# Patient Record
Sex: MALE | Race: WHITE
[De-identification: names, ages, dates, MRNs, and addresses within clinical notes are randomized per-mention and may not be internally consistent; named-entity substitution may affect disease eponyms.]

---

## 2017-06-22 ENCOUNTER — HOSPITAL ENCOUNTER (EMERGENCY)
Dept: HOSPITAL 25 - UCEAST | Age: 52
Discharge: HOME | End: 2017-06-22
Payer: COMMERCIAL

## 2017-06-22 VITALS — DIASTOLIC BLOOD PRESSURE: 97 MMHG | SYSTOLIC BLOOD PRESSURE: 139 MMHG

## 2017-06-22 DIAGNOSIS — L03.115: ICD-10-CM

## 2017-06-22 DIAGNOSIS — L02.415: Primary | ICD-10-CM

## 2017-06-22 DIAGNOSIS — I10: ICD-10-CM

## 2017-06-22 DIAGNOSIS — E11.9: ICD-10-CM

## 2017-06-22 PROCEDURE — 99212 OFFICE O/P EST SF 10 MIN: CPT

## 2017-06-22 PROCEDURE — 96372 THER/PROPH/DIAG INJ SC/IM: CPT

## 2017-06-22 PROCEDURE — 81003 URINALYSIS AUTO W/O SCOPE: CPT

## 2017-06-22 PROCEDURE — G0463 HOSPITAL OUTPT CLINIC VISIT: HCPCS

## 2017-06-22 NOTE — UC
Skin Complaint HPI





- HPI Summary


HPI Summary: 


Fevers and Chills on off for past 3 days---did have some loose stool yesterday, 

started his horses Bactrim on Tuesday night, does have a abscess on right thigh-

-that has been draining ---has a 6x7 inches area on right upper thigh of 

erythema, no abscess








- History of Current Complaint


Chief Complaint: UCGeneralIllness


Time Seen by Provider: 06/22/17 13:36


Stated Complaint: FEVER


Hx Obtained From: Patient


Onset/Duration: Sudden Onset, Lasting Days - 4, Still Present


Timing: Constant


Onset Severity: Moderate


Current Severity: Moderate


Location: Discrete - right upper anterior thigh


Character: Redness, Painful


Aggravating: Nothing


Alleviating: Nothing


Associated Signs & Symptoms: Positive: Negative





- Allergy/Home Medications


Allergies/Adverse Reactions: 


 Allergies











Allergy/AdvReac Type Severity Reaction Status Date / Time


 


No Known Allergies Allergy   Verified 04/09/15 18:32











Home Medications: 


 Home Medications





Sulfamethox/Trimethoprim DS* [Bactrim /160 TAB*] 1 tab PO BID 06/22/17 [

History Confirmed 06/22/17]











Review of Systems


Constitutional: Fever


Skin: Negative


Eyes: Negative


ENT: Negative


Respiratory: Negative


Cardiovascular: Negative


Gastrointestinal: Negative, Diarrhea


Genitourinary: Negative


Motor: Negative


Neurovascular: Negative


Musculoskeletal: Negative


Neurological: Negative


Psychological: Negative


All Other Systems Reviewed And Are Negative: Yes





PMH/Surg Hx/FS Hx/Imm Hx


Previously Healthy: No


Endocrine History: Diabetes


Cardiovascular History: Hypertension





- Surgical History


Surgical History: Yes


Surgery Procedure, Year, and Place: right foot - crushed calcaneous





- Family History


Known Family History: Positive: Hypertension





- Social History


Occupation: Employed Full-time


Lives: With Family


Alcohol Use: Rare


Substance Use Type: None


Smoking Status (MU): Never Smoked Tobacco





- Immunization History


Most Recent Tetanus Shot: within the last 10 years





Physical Exam


Triage Information Reviewed: Yes


Appearance: Well-Appearing, No Pain Distress, Well-Nourished


Vital Signs: 


 Initial Vital Signs











Temp  97.8 F   06/22/17 13:14


 


Pulse  99   06/22/17 13:14


 


Resp  18   06/22/17 13:14


 


BP  139/97   06/22/17 13:14


 


Pulse Ox  98   06/22/17 13:14











Vital Signs Reviewed: Yes


Eye Exam: Normal


Eyes: Positive: Conjunctiva Clear


ENT Exam: Normal


ENT: Positive: Normal ENT inspection, Hearing grossly normal, Pharynx normal, 

TMs normal.  Negative: Nasal congestion, Nasal drainage, Tonsillar swelling, 

Tonsillar exudate, Trismus, Muffled/hoarse voice


Dental Exam: Normal


Neck exam: Normal


Neck: Positive: Supple, Nontender, No Lymphadenopathy


Respiratory Exam: Normal


Respiratory: Positive: Chest non-tender, Lungs clear, Normal breath sounds, No 

respiratory distress, No accessory muscle use


Cardiovascular Exam: Normal


Cardiovascular: Positive: RRR, No Murmur, Pulses Normal, Brisk Capillary Refill


Abdominal Exam: Normal


Abdomen Description: Positive: Nontender, No Organomegaly, Soft


Bowel Sounds: Positive: Present


Musculoskeletal Exam: Normal


Musculoskeletal: Positive: Strength Intact, ROM Intact, No Edema


Neurological Exam: Normal


Neurological: Positive: Alert, Muscle Tone Normal


Psychological Exam: Normal


Skin: Positive: Other - 6x7 inch erythema right upper leg





Course/Dx





- Course


Course Of Treatment: rocephin, keflex, bactrim, follow with pcp in the next 

couple of days





- Differential Diagnoses - Skin Complaint


Differential Diagnoses: Abscess, Cellulitis, Impetigo





- Diagnoses


Provider Diagnoses: Abscess with cellulitis right leg





Discharge





- Discharge Plan


Condition: Stable


Disposition: HOME


Prescriptions: 


Cephalexin CAP* [Keflex CAP*] 500 mg PO QID #28 cap


Sulfamethox/Trimethoprim DS* [Bactrim /160 TAB*] 1 tab PO BID #14 tab


Patient Education Materials:  Cellulitis (ED), Fever in Adults (ED)


Referrals: 


Haile Bach MD [Primary Care Provider] - 3 Days

## 2018-01-14 ENCOUNTER — HOSPITAL ENCOUNTER (EMERGENCY)
Dept: HOSPITAL 25 - UCEAST | Age: 53
Discharge: HOME | End: 2018-01-14
Payer: COMMERCIAL

## 2018-01-14 VITALS — SYSTOLIC BLOOD PRESSURE: 136 MMHG | DIASTOLIC BLOOD PRESSURE: 87 MMHG

## 2018-01-14 DIAGNOSIS — J45.901: ICD-10-CM

## 2018-01-14 DIAGNOSIS — J11.1: Primary | ICD-10-CM

## 2018-01-14 PROCEDURE — 99213 OFFICE O/P EST LOW 20 MIN: CPT

## 2018-01-14 PROCEDURE — G0463 HOSPITAL OUTPT CLINIC VISIT: HCPCS

## 2018-01-14 PROCEDURE — 87502 INFLUENZA DNA AMP PROBE: CPT

## 2018-01-14 NOTE — UC
Respiratory Complaint HPI





- HPI Summary


HPI Summary: 





Cough, shortness of breath, malaise starting 2 days ago. Has a proair inhaler. 

Denies known fever; trouble sleeping due to cough lying down.





- History of Current Complaint


Chief Complaint: UCRespiratory


Stated Complaint: CHEST COLD


Time Seen by Provider: 01/14/18 16:42


Hx Obtained From: Patient


Onset/Duration: Gradual Onset, Lasting Days


Timing: Constant


Severity Initially: Moderate


Severity Currently: Moderate


Character: Cough: Productive


Aggravating Factors: Deep Breaths, Recumbent Position


Alleviating Factors: Bronchodilator


Associated Signs And Symptoms: Positive: Dyspnea, URI, Nasal Congestion





- Allergies/Home Medications


Allergies/Adverse Reactions: 


 Allergies











Allergy/AdvReac Type Severity Reaction Status Date / Time


 


No Known Allergies Allergy   Verified 04/09/15 18:32














PMH/Surg Hx/FS Hx/Imm Hx


Endocrine History: Diabetes - (pre)


Respiratory History: Asthma


GI/ History: Gastroesophageal Reflux





- Surgical History


Surgical History: Yes


Surgery Procedure, Year, and Place: right foot - crushed calcaneous





- Family History


Known Family History: Positive: Hypertension





- Social History


Occupation: Employed Full-time - IT for [employer]


Alcohol Use: Rare


Substance Use Type: None


Smoking Status (MU): Never Smoked Tobacco





- Immunization History


Most Recent Tetanus Shot: within the last 10 years





Review of Systems


Constitutional: Fatigue


Skin: Negative


Eyes: Negative


ENT: Sore Throat


Respiratory: Shortness Of Breath, Cough


Cardiovascular: Negative


Gastrointestinal: Negative


Genitourinary: Negative


Motor: Negative


Neurovascular: Negative


Musculoskeletal: Negative


Neurological: Negative


Psychological: Negative


Is Patient Immunocompromised?: No


All Other Systems Reviewed And Are Negative: Yes





Physical Exam


Triage Information Reviewed: Yes


Appearance: Ill-Appearing - acute illness, Obese


Vital Signs: 


 Initial Vital Signs











Temp  99.1 F   01/14/18 16:28


 


Pulse  111   01/14/18 16:28


 


Resp  24   01/14/18 16:28


 


BP  136/87   01/14/18 16:28


 


Pulse Ox  99   01/14/18 16:28











Vital Signs Reviewed: Yes


Eye Exam: Normal


Eyes: Positive: Conjunctiva Clear


ENT: Positive: Hearing grossly normal, TMs normal.  Negative: Pharyngeal 

erythema - mouth dry MM, Tonsillar swelling


Dental Exam: Normal


Neck exam: Normal


Neck: Positive: Supple, Nontender, No Lymphadenopathy


Respiratory: Positive: Accessory muscle use, Rhonchi, Wheezing, Other: - 

frequent dry hacking cough


Cardiovascular: Positive: No Murmur, Tachycardia


Musculoskeletal Exam: Normal


Neurological Exam: Normal


Neurological: Positive: Alert


Psychological Exam: Normal


Skin Exam: Normal





UC Diagnostic Evaluation





- Laboratory


O2 Sat by Pulse Oximetry: 99





Respiratory Course/Dx





- Differential Dx/Diagnosis


Provider Diagnoses: influenza.  asthma exacerbation





Discharge





- Discharge Plan


Condition: Stable


Disposition: HOME


Patient Education Materials:  Influenza (ED), Bronchospasm (ED)


Forms:  *Work Release


Referrals: 


Haile Bach MD [Primary Care Provider] - 2 Days


Additional Instructions: 


Follow up with your primary care office in 2-3 days; start the inhaled steroid. 

You might need to stay on the inhaled medicine for 2 weeks or more.

## 2018-01-17 ENCOUNTER — HOSPITAL ENCOUNTER (INPATIENT)
Dept: HOSPITAL 25 - ED | Age: 53
LOS: 3 days | Discharge: HOME | DRG: 141 | End: 2018-01-20
Attending: HOSPITALIST | Admitting: INTERNAL MEDICINE
Payer: COMMERCIAL

## 2018-01-17 DIAGNOSIS — Z79.51: ICD-10-CM

## 2018-01-17 DIAGNOSIS — Z82.49: ICD-10-CM

## 2018-01-17 DIAGNOSIS — E11.9: ICD-10-CM

## 2018-01-17 DIAGNOSIS — Z87.891: ICD-10-CM

## 2018-01-17 DIAGNOSIS — E87.6: ICD-10-CM

## 2018-01-17 DIAGNOSIS — B37.9: ICD-10-CM

## 2018-01-17 DIAGNOSIS — K21.9: ICD-10-CM

## 2018-01-17 DIAGNOSIS — R79.89: ICD-10-CM

## 2018-01-17 DIAGNOSIS — I10: ICD-10-CM

## 2018-01-17 DIAGNOSIS — Z79.84: ICD-10-CM

## 2018-01-17 DIAGNOSIS — J96.01: ICD-10-CM

## 2018-01-17 DIAGNOSIS — J44.9: ICD-10-CM

## 2018-01-17 DIAGNOSIS — E78.5: ICD-10-CM

## 2018-01-17 DIAGNOSIS — J45.901: Primary | ICD-10-CM

## 2018-01-17 DIAGNOSIS — R74.8: ICD-10-CM

## 2018-01-17 DIAGNOSIS — G47.33: ICD-10-CM

## 2018-01-17 DIAGNOSIS — J11.00: ICD-10-CM

## 2018-01-17 DIAGNOSIS — M54.9: ICD-10-CM

## 2018-01-17 DIAGNOSIS — R40.2412: ICD-10-CM

## 2018-01-17 DIAGNOSIS — G89.29: ICD-10-CM

## 2018-01-17 LAB
BASOPHILS # BLD AUTO: 0 10^3/UL (ref 0–0.2)
EOSINOPHIL # BLD AUTO: 0 10^3/UL (ref 0–0.6)
HCT VFR BLD AUTO: 41 % (ref 42–52)
HGB BLD-MCNC: 13.8 G/DL (ref 14–18)
INR PPP/BLD: 0.98 (ref 0.77–1.02)
LYMPHOCYTES # BLD AUTO: 1.3 10^3/UL (ref 1–4.8)
MCH RBC QN AUTO: 29 PG (ref 27–31)
MCHC RBC AUTO-ENTMCNC: 33 G/DL (ref 31–36)
MCV RBC AUTO: 88 FL (ref 80–94)
MONOCYTES # BLD AUTO: 0.8 10^3/UL (ref 0–0.8)
NEUTROPHILS # BLD AUTO: 13.7 10^3/UL (ref 1.5–7.7)
NRBC # BLD AUTO: 0 10^3/UL
NRBC BLD QL AUTO: 0
PLATELET # BLD AUTO: 331 10^3/UL (ref 150–450)
RBC # BLD AUTO: 4.69 10^6/UL (ref 4–5.4)
WBC # BLD AUTO: 15.8 10^3/UL (ref 3.5–10.8)

## 2018-01-17 PROCEDURE — 81003 URINALYSIS AUTO W/O SCOPE: CPT

## 2018-01-17 PROCEDURE — 71275 CT ANGIOGRAPHY CHEST: CPT

## 2018-01-17 PROCEDURE — 94640 AIRWAY INHALATION TREATMENT: CPT

## 2018-01-17 PROCEDURE — 80053 COMPREHEN METABOLIC PANEL: CPT

## 2018-01-17 PROCEDURE — 83605 ASSAY OF LACTIC ACID: CPT

## 2018-01-17 PROCEDURE — 87502 INFLUENZA DNA AMP PROBE: CPT

## 2018-01-17 PROCEDURE — 83880 ASSAY OF NATRIURETIC PEPTIDE: CPT

## 2018-01-17 PROCEDURE — 85379 FIBRIN DEGRADATION QUANT: CPT

## 2018-01-17 PROCEDURE — 81015 MICROSCOPIC EXAM OF URINE: CPT

## 2018-01-17 PROCEDURE — 83690 ASSAY OF LIPASE: CPT

## 2018-01-17 PROCEDURE — 94760 N-INVAS EAR/PLS OXIMETRY 1: CPT

## 2018-01-17 PROCEDURE — 85025 COMPLETE CBC W/AUTO DIFF WBC: CPT

## 2018-01-17 PROCEDURE — 87040 BLOOD CULTURE FOR BACTERIA: CPT

## 2018-01-17 PROCEDURE — 36415 COLL VENOUS BLD VENIPUNCTURE: CPT

## 2018-01-17 PROCEDURE — 80076 HEPATIC FUNCTION PANEL: CPT

## 2018-01-17 PROCEDURE — 85730 THROMBOPLASTIN TIME PARTIAL: CPT

## 2018-01-17 PROCEDURE — 84484 ASSAY OF TROPONIN QUANT: CPT

## 2018-01-17 PROCEDURE — 80048 BASIC METABOLIC PNL TOTAL CA: CPT

## 2018-01-17 PROCEDURE — 85610 PROTHROMBIN TIME: CPT

## 2018-01-17 PROCEDURE — 76705 ECHO EXAM OF ABDOMEN: CPT

## 2018-01-17 PROCEDURE — 93005 ELECTROCARDIOGRAM TRACING: CPT

## 2018-01-17 PROCEDURE — 71045 X-RAY EXAM CHEST 1 VIEW: CPT

## 2018-01-17 PROCEDURE — 86140 C-REACTIVE PROTEIN: CPT

## 2018-01-17 RX ADMIN — SODIUM CHLORIDE ONE ML: 900 IRRIGANT IRRIGATION at 09:52

## 2018-01-17 RX ADMIN — SODIUM CHLORIDE SCH MLS/HR: 900 IRRIGANT IRRIGATION at 14:19

## 2018-01-17 RX ADMIN — TAPENTADOL HYDROCHLORIDE SCH: 50 TABLET, FILM COATED ORAL at 14:35

## 2018-01-17 RX ADMIN — INSULIN LISPRO SCH UNIT: 100 INJECTION, SOLUTION INTRAVENOUS; SUBCUTANEOUS at 17:05

## 2018-01-17 RX ADMIN — CEFTRIAXONE SODIUM SCH: 1 INJECTION, POWDER, FOR SOLUTION INTRAVENOUS at 13:40

## 2018-01-17 RX ADMIN — HEPARIN SODIUM SCH UNITS: 5000 INJECTION INTRAVENOUS; SUBCUTANEOUS at 23:14

## 2018-01-17 RX ADMIN — CITALOPRAM SCH MG: 40 TABLET ORAL at 23:30

## 2018-01-17 RX ADMIN — HEPARIN SODIUM SCH UNITS: 5000 INJECTION INTRAVENOUS; SUBCUTANEOUS at 14:18

## 2018-01-17 RX ADMIN — SODIUM CHLORIDE ONE ML: 900 IRRIGANT IRRIGATION at 08:20

## 2018-01-17 RX ADMIN — TAPENTADOL HYDROCHLORIDE SCH MG: 50 TABLET, FILM COATED ORAL at 15:26

## 2018-01-17 RX ADMIN — SODIUM CHLORIDE ONE ML: 900 IRRIGANT IRRIGATION at 08:43

## 2018-01-17 RX ADMIN — MOMETASONE FUROATE AND FORMOTEROL FUMARATE DIHYDRATE SCH PUFF: 200; 5 AEROSOL RESPIRATORY (INHALATION) at 20:30

## 2018-01-17 RX ADMIN — IPRATROPIUM BROMIDE AND ALBUTEROL SULFATE SCH NEB: .5; 3 SOLUTION RESPIRATORY (INHALATION) at 13:28

## 2018-01-17 RX ADMIN — IPRATROPIUM BROMIDE AND ALBUTEROL SULFATE SCH NEB: .5; 3 SOLUTION RESPIRATORY (INHALATION) at 20:30

## 2018-01-17 RX ADMIN — TAPENTADOL HYDROCHLORIDE SCH MG: 50 TABLET, FILM COATED ORAL at 23:09

## 2018-01-17 NOTE — ED
Rohan CHO Angela, scribed for Lico Clifton MD on 01/17/18 at 0810 .





Shortness of Breath





- HPI Summary


HPI Summary: 





This pt is a 53 y/o male presenting to [facility] via EMS c/o worsening SOB and 

cough. Pt reports he was seen at Urgent Care on 1/14/18 and was diagnosed with 

influenza B. Pt states that he was given Tamiflu, Robitussin, prednisone, 

duoneb. He notes that since then the pt has had worsening cough, SOB, and 

intermittent fever (max of 102.5F), nausea, continued diarrhea, muscle ache, 

and wheezing. Denies vomiting, abd pain. Pt states he can only walk a few feet 

before he gets SOB, he normally walks 1 mile. Pt notes his swelling in LE is 

nothing acute. 


Pt has had a previous hospitalization more than 10 years ago for a respiratory 

illness. 


PMHx includes COPD (diagnosed 10+ years), asthma, pre-diabetes.


NKDA. 





- History of Current Complaint


Chief Complaint: EDShortnessOfBreath


Time Seen by Provider: 01/17/18 08:04


Hx Obtained From: Patient


Onset/Duration: Lasting Days, Still Present


Timing: Constant


Current Severity: Severe


Dyspnea At: Rest


Aggrevating Factors: Nothing


Alleviating Factors: Oxygen


Associated Signs & Symptoms: Cough (Productive), Wheezing, Fever, Calf Pain/

Swelling - calf swelling not acute





- Allergy/Home Medications


Allergies/Adverse Reactions: 


 Allergies











Allergy/AdvReac Type Severity Reaction Status Date / Time


 


No Known Allergies Allergy   Verified 04/09/15 18:32











Home Medications: 


 Home Medications





Budesonide/Formote 160/4.5(NF) [Symbicort 160/4.5 (NF)] 2 puff INH BID 01/17/18 

[History Confirmed 01/17/18]


Esomeprazole(NF) [NexIUM(NF)] 40 mg PO DAILY 01/17/18 [History Confirmed 01/17/ 18]


Fexofenadine (NF) [Allegra (NF)] 60 mg PO DAILY PRN 01/17/18 [History Confirmed 

01/17/18]


Oseltamivir CAP* [Tamiflu CAP*] 75 mg PO BID 01/17/18 [History Confirmed 01/17/ 18]


Tapentadol ER (NF) [Nucynta ER (NF)] 150 mg PO BID 01/17/18 [History Confirmed 

01/17/18]











PMH/Surg Hx/FS Hx/Imm Hx


Endocrine/Hematology History: Reports: Hx Diabetes - pre-diabetes


   Denies: Hx Thyroid Disease


Cardiovascular History: 


   Denies: Hx Hypertension


Respiratory History: Reports: Hx Asthma, Hx Sleep Apnea


   Denies: Hx Chronic Obstructive Pulmonary Disease (COPD)


GI History: Reports: Hx Ulcer - gerd





- Surgical History


Surgery Procedure, Year, and Place: right foot - crushed calcaneous


Infectious Disease History: No


Infectious Disease History: 


   Denies: Hx Clostridium Difficile, Hx Hepatitis, Hx Human Immunodeficiency 

Virus (HIV), Hx of Known/Suspected MRSA, Hx Shingles, Hx Tuberculosis, Traveled 

Outside the [place] in Last 30 Days





- Family History


Known Family History: Positive: Hypertension


   Negative: Other - FHx of COPD or liver disease





- Social History


Alcohol Use: Rare


Substance Use Type: Reports: None


Smoking Status (MU): Never Smoked Tobacco





Review of Systems


Positive: Fever


Positive: Shortness Of Breath, Cough


Positive: Diarrhea, Nausea.  Negative: Abdominal Pain, Vomiting


Positive: Myalgia, Edema - in LE, not acute


All Other Systems Reviewed And Are Negative: Yes





Physical Exam





- Summary


Physical Exam Summary: 





General: mildly ill-appearing, no pain distress


Skin: warm, color reflects adequate perfusion, dry


Head: normal


Eyes: EOMI, RHODA


ENT: normal


Neck: supple, nontender


Respiratory: Lungs with rhonchi and wheezes bilaterally, breath sounds present


Cardiovascular: tachycardic


Abdomen: soft, nontender


Bowel: present


Musculoskeletal: strength/ROM intact. RLE: 1+ pitting edema. LLE: trace pitting 

edema. Calfs are nontender.


Neurological: normal, sensory/motor intact, A&O x3


Psychological: affect/mood appropriate


Triage Information Reviewed: Yes


Vital Signs On Initial Exam: 


 Initial Vitals











Temp Pulse Resp BP Pulse Ox


 


 100.5 F   109   22   149/69   95 


 


 01/17/18 07:56  01/17/18 07:56  01/17/18 07:56  01/17/18 07:56  01/17/18 07:56











Vital Signs Reviewed: Yes





- Jada Coma Scale


Coma Scale Total: 15





Diagnostics





- Vital Signs


 Vital Signs











  Temp Pulse Resp BP Pulse Ox


 


 01/17/18 07:56  100.5 F  109  22  149/69  95














- Laboratory


Lab Results: 


 Lab Results











  01/17/18 01/17/18 01/17/18 Range/Units





  08:19 08:19 08:19 


 


WBC     (3.5-10.8)  10^3/ul


 


RBC     (4.0-5.4)  10^6/ul


 


Hgb     (14.0-18.0)  g/dl


 


Hct     (42-52)  %


 


MCV     (80-94)  fL


 


MCH     (27-31)  pg


 


MCHC     (31-36)  g/dl


 


RDW     (10.5-15)  %


 


Plt Count     (150-450)  10^3/ul


 


MPV     (7.4-10.4)  um3


 


Neut % (Auto)     (38-83)  %


 


Lymph % (Auto)     (25-47)  %


 


Mono % (Auto)     (1-9)  %


 


Eos % (Auto)     (0-6)  %


 


Baso % (Auto)     (0-2)  %


 


Absolute Neuts (auto)     (1.5-7.7)  10^3/ul


 


Absolute Lymphs (auto)     (1.0-4.8)  10^3/ul


 


Absolute Monos (auto)     (0-0.8)  10^3/ul


 


Absolute Eos (auto)     (0-0.6)  10^3/ul


 


Absolute Basos (auto)     (0-0.2)  10^3/ul


 


Absolute Nucleated RBC     10^3/ul


 


Nucleated RBC %     


 


INR (Anticoag Therapy)  0.98    (0.77-1.02)  


 


APTT  16.6 L    (26.0-36.3)  seconds


 


D-Dimer, Quantitative  244 H    (Less Than 230)  ng/mL


 


Sodium    130 L  (133-145)  mmol/L


 


Potassium    TNP  


 


Chloride    91 L  (101-111)  mmol/L


 


Carbon Dioxide    30  (22-32)  mmol/L


 


Anion Gap    9  (2-11)  mmol/L


 


BUN    15  (6-24)  mg/dL


 


Creatinine    0.91  (0.67-1.17)  mg/dL


 


Est GFR ( Amer)    113.0  (>60)  


 


Est GFR (Non-Af Amer)    87.8  (>60)  


 


BUN/Creatinine Ratio    16.5  (8-20)  


 


Glucose    199 H  ()  mg/dL


 


Lactic Acid     (0.5-2.0)  mmol/L


 


Calcium    9.4  (8.6-10.3)  mg/dL


 


Total Bilirubin    0.90  (0.2-1.0)  mg/dL


 


AST    TNP  


 


ALT    55 H  (7-52)  U/L


 


Alkaline Phosphatase    69  ()  U/L


 


Troponin I    0.04 H*  (<0.04)  ng/mL


 


C-Reactive Protein    148.59 H  (< 5.00)  mg/L


 


B-Natriuretic Peptide   62  ( - 100) pg/mL


 


Total Protein    7.7  (6.4-8.9)  g/dL


 


Albumin    4.2  (3.2-5.2)  g/dL


 


Globulin    3.5  (2-4)  g/dL


 


Albumin/Globulin Ratio    1.2  (1-3)  


 


Lipase    212 H  (11.0-82.0)  U/L


 


Urine Color     


 


Urine Appearance     


 


Urine pH     (5-9)  


 


Ur Specific Gravity     (1.010-1.030)  


 


Urine Protein     (Negative)  


 


Urine Ketones     (Negative)  


 


Urine Blood     (Negative)  


 


Urine Nitrate     (Negative)  


 


Urine Bilirubin     (Negative)  


 


Urine Urobilinogen     (Negative)  


 


Ur Leukocyte Esterase     (Negative)  


 


Urine WBC (Auto)     (Absent)  


 


Urine RBC (Auto)     (Absent)  


 


Urine Bacteria     (Absent)  


 


Urine Glucose     (Negative)  














  01/17/18 01/17/18 01/17/18 Range/Units





  08:19 08:19 09:02 


 


WBC  15.8 H    (3.5-10.8)  10^3/ul


 


RBC  4.69    (4.0-5.4)  10^6/ul


 


Hgb  13.8 L    (14.0-18.0)  g/dl


 


Hct  41 L    (42-52)  %


 


MCV  88    (80-94)  fL


 


MCH  29    (27-31)  pg


 


MCHC  33    (31-36)  g/dl


 


RDW  16 H    (10.5-15)  %


 


Plt Count  331    (150-450)  10^3/ul


 


MPV  8    (7.4-10.4)  um3


 


Neut % (Auto)  86.8 H    (38-83)  %


 


Lymph % (Auto)  7.9 L    (25-47)  %


 


Mono % (Auto)  5.0    (1-9)  %


 


Eos % (Auto)  0    (0-6)  %


 


Baso % (Auto)  0.3    (0-2)  %


 


Absolute Neuts (auto)  13.7 H    (1.5-7.7)  10^3/ul


 


Absolute Lymphs (auto)  1.3    (1.0-4.8)  10^3/ul


 


Absolute Monos (auto)  0.8    (0-0.8)  10^3/ul


 


Absolute Eos (auto)  0    (0-0.6)  10^3/ul


 


Absolute Basos (auto)  0    (0-0.2)  10^3/ul


 


Absolute Nucleated RBC  0    10^3/ul


 


Nucleated RBC %  0    


 


INR (Anticoag Therapy)     (0.77-1.02)  


 


APTT     (26.0-36.3)  seconds


 


D-Dimer, Quantitative     (Less Than 230)  ng/mL


 


Sodium     (133-145)  mmol/L


 


Potassium     


 


Chloride     (101-111)  mmol/L


 


Carbon Dioxide     (22-32)  mmol/L


 


Anion Gap     (2-11)  mmol/L


 


BUN     (6-24)  mg/dL


 


Creatinine     (0.67-1.17)  mg/dL


 


Est GFR ( Amer)     (>60)  


 


Est GFR (Non-Af Amer)     (>60)  


 


BUN/Creatinine Ratio     (8-20)  


 


Glucose     ()  mg/dL


 


Lactic Acid   1.8   (0.5-2.0)  mmol/L


 


Calcium     (8.6-10.3)  mg/dL


 


Total Bilirubin     (0.2-1.0)  mg/dL


 


AST     


 


ALT     (7-52)  U/L


 


Alkaline Phosphatase     ()  U/L


 


Troponin I     (<0.04)  ng/mL


 


C-Reactive Protein     (< 5.00)  mg/L


 


B-Natriuretic Peptide    ( - 100) pg/mL


 


Total Protein     (6.4-8.9)  g/dL


 


Albumin     (3.2-5.2)  g/dL


 


Globulin     (2-4)  g/dL


 


Albumin/Globulin Ratio     (1-3)  


 


Lipase     (11.0-82.0)  U/L


 


Urine Color    Yellow  


 


Urine Appearance    Clear  


 


Urine pH    7.0  (5-9)  


 


Ur Specific Gravity    1.012  (1.010-1.030)  


 


Urine Protein    1+(30 mg/dl) H  (Negative)  


 


Urine Ketones    Trace H  (Negative)  


 


Urine Blood    Negative  (Negative)  


 


Urine Nitrate    Negative  (Negative)  


 


Urine Bilirubin    Negative  (Negative)  


 


Urine Urobilinogen    Negative  (Negative)  


 


Ur Leukocyte Esterase    Negative  (Negative)  


 


Urine WBC (Auto)    Trace(0-5/hpf)  (Absent)  


 


Urine RBC (Auto)    Trace(0-2/hpf)  (Absent)  


 


Urine Bacteria    Absent  (Absent)  


 


Urine Glucose    Negative  (Negative)  














  01/17/18 01/17/18 Range/Units





  10:00 11:55 


 


WBC    (3.5-10.8)  10^3/ul


 


RBC    (4.0-5.4)  10^6/ul


 


Hgb    (14.0-18.0)  g/dl


 


Hct    (42-52)  %


 


MCV    (80-94)  fL


 


MCH    (27-31)  pg


 


MCHC    (31-36)  g/dl


 


RDW    (10.5-15)  %


 


Plt Count    (150-450)  10^3/ul


 


MPV    (7.4-10.4)  um3


 


Neut % (Auto)    (38-83)  %


 


Lymph % (Auto)    (25-47)  %


 


Mono % (Auto)    (1-9)  %


 


Eos % (Auto)    (0-6)  %


 


Baso % (Auto)    (0-2)  %


 


Absolute Neuts (auto)    (1.5-7.7)  10^3/ul


 


Absolute Lymphs (auto)    (1.0-4.8)  10^3/ul


 


Absolute Monos (auto)    (0-0.8)  10^3/ul


 


Absolute Eos (auto)    (0-0.6)  10^3/ul


 


Absolute Basos (auto)    (0-0.2)  10^3/ul


 


Absolute Nucleated RBC    10^3/ul


 


Nucleated RBC %    


 


INR (Anticoag Therapy)    (0.77-1.02)  


 


APTT    (26.0-36.3)  seconds


 


D-Dimer, Quantitative    (Less Than 230)  ng/mL


 


Sodium    (133-145)  mmol/L


 


Potassium  3.3 L   


 


Chloride    (101-111)  mmol/L


 


Carbon Dioxide    (22-32)  mmol/L


 


Anion Gap    (2-11)  mmol/L


 


BUN    (6-24)  mg/dL


 


Creatinine    (0.67-1.17)  mg/dL


 


Est GFR ( Amer)    (>60)  


 


Est GFR (Non-Af Amer)    (>60)  


 


BUN/Creatinine Ratio    (8-20)  


 


Glucose    ()  mg/dL


 


Lactic Acid   2.0  (0.5-2.0)  mmol/L


 


Calcium    (8.6-10.3)  mg/dL


 


Total Bilirubin    (0.2-1.0)  mg/dL


 


AST  45 H   


 


ALT    (7-52)  U/L


 


Alkaline Phosphatase    ()  U/L


 


Troponin I    (<0.04)  ng/mL


 


C-Reactive Protein    (< 5.00)  mg/L


 


B-Natriuretic Peptide   ( - 100) pg/mL


 


Total Protein    (6.4-8.9)  g/dL


 


Albumin    (3.2-5.2)  g/dL


 


Globulin    (2-4)  g/dL


 


Albumin/Globulin Ratio    (1-3)  


 


Lipase    (11.0-82.0)  U/L


 


Urine Color    


 


Urine Appearance    


 


Urine pH    (5-9)  


 


Ur Specific Gravity    (1.010-1.030)  


 


Urine Protein    (Negative)  


 


Urine Ketones    (Negative)  


 


Urine Blood    (Negative)  


 


Urine Nitrate    (Negative)  


 


Urine Bilirubin    (Negative)  


 


Urine Urobilinogen    (Negative)  


 


Ur Leukocyte Esterase    (Negative)  


 


Urine WBC (Auto)    (Absent)  


 


Urine RBC (Auto)    (Absent)  


 


Urine Bacteria    (Absent)  


 


Urine Glucose    (Negative)  











Result Diagrams: 


 01/17/18 08:19





 01/17/18 10:00


Lab Statement: Any lab studies that have been ordered have been reviewed, and 

results considered in the medical decision making process.





- Radiology


  ** Chest XR


Xray Interpretation: No Acute Changes - IMPRESSION: No active cardiopulmonary 

disease. Dr. Clifton has reviewed this radiology report.


Radiology Interpretation Completed By: Radiologist





- EKG


  ** 08:17


Cardiac Rate: Tachycardia


EKG Rhythm: Sinus Tachycardia - at 100 bpm


ST Segment: Normal


Ectopy: None





Course/Dx





- Course


Course Of Treatment: Medications reviewed. Labs show troponin is 0.04.  In the 

ED course the pt was given duoneb, IV fluids, Zofran, Rocephin, and 

Azithromycin. Chest XR is negative for pneumonia.  I discussed pt care with Dr. Sanders, hospitalist, who has agreed to admit the pt.  ADMIT HOSPITALIST.  NO 

CRITICAL CARE TIME





- Diagnoses


Provider Diagnoses: 


 COPD exacerbation, Influenza, Elevated troponin, Hypoxia








- Physician Notifications


Discussed Care of Patient With: Cristobal Sanders


Time Discussed With Above Provider: 09:48


Instructed by Provider To: Other - I discussed pt care with Dr. Sanders, 

hospitalist, who has agreed to admit the pt.





Discharge





- Discharge Plan


Condition: Stable


Disposition: ADMITTED TO [facility]


Referrals: 


Haile Bach MD [Primary Care Provider] - 





The documentation as recorded by the Rohan carbone Angela accurately reflects 

the service I personally performed and the decisions made by me, Lico Clifton MD.

## 2018-01-17 NOTE — RAD
HISTORY: Shortness of breath



COMPARISONS: April 09, 2015



VIEWS: 1: frontal portable view of the chest at 8:35 AM



FINDINGS:

LINES AND TUBES: None.

CARDIOMEDIASTINAL SILHOUETTE: The aorta is tortuous, this is stable. The cardiomediastinal

silhouette is otherwise normal for portable technique.

PLEURA: The costophrenic angles are sharp. No pleural abnormalities are noted.

LUNG PARENCHYMA: There is hyperinflation.

ABDOMEN: The upper abdomen is clear. There is no subphrenic gas.

BONES AND SOFT TISSUES: No bone or soft tissue abnormalities are noted.



IMPRESSION: NO ACTIVE CARDIOPULMONARY DISEASE.

## 2018-01-17 NOTE — HP
CC:  Dr. Bach. *

 

HISTORY AND PHYSICAL:

 

DATE OF ADMISSION:  01/17/18

 

PRIMARY CARE PROVIDER:  Dr. Bach.

 

ATTENDING PHYSICIAN WHILE IN THE HOSPITAL:  Bhupendra Sanders MD * (report 
dictated by Anish Arriaga NP).

 

CHIEF COMPLAINT:

1.  Cough.

2.  Not feeling well.

3.  Influenza.

 

HISTORY OF PRESENT ILLNESS:  Mr. Galvez is a 52-year-old male patient, who 
has a history of hypertension, diabetes, hyperlipidemia, history of GERD, 
chronic back pain.  He also carries a history of he says asthma.  He comes into 
our ER today. He says on Friday, he started feeling congested.  He was not 
feeling well and was feeling tired.  He noted throughout the weekend he started 
aching.  He was having fevers off and on.  He was just aching allover.  He was 
coughing.  He was feeling short of breath.  He went to Urgent Care and was 
diagnosed with a flu and sent out on Tamiflu with his last day is today.  He 
said that he was taking the prednisone and Tamiflu and then he noticed that he 
was not getting any better over the last couple of days.  He has been more 
short of breath.  He has been coughing.  He really says he has not had any 
fevers, denies any abdominal pain.  He says he just has no appetite.  He feels 
nauseated.  He feels more short of breath particularly with exertion.  He said 
that he only has chest pain when he coughs.  Denied any exertional chest pain.  
He denied having any abdominal discomfort.  He says he does not had any 
orthopnea or any nocturnal dyspnea or any weight gain, but he is concerned 
because of that breathing that was not getting any better, so he came into our 
ER today to be evaluated.

 

PAST MEDICAL HISTORY:  Significant for:

1.  Hypertension.

2.  Diabetes.

3.  Hyperlipidemia.

4.  GERD.

5.  Chronic back pain.

6.  Asthma.

 

PAST SURGICAL HISTORY:  He has had foot surgery.

 

HOME MEDICATIONS:  Include:

1.  Allegra 60 mg daily as needed.

2.  Nexium 40 mg daily.

3.  Nucynta 150 mg p.o. b.i.d.

4.  Tamiflu 75 mg p.o. b.i.d.  He says his last dose is tonight.

5.  Symbicort 2 puffs inhaled b.i.d.

6.  Tylenol with codeine 1 tablet every 6 hours as needed.

7.  Hydrochlorothiazide 25 mg daily.

8.  Flonase 2 sprays inhaled daily.

9.  Flovent 1 puff inhaled b.i.d.

10.  Lexapro 20 mg daily.

11.  Lipitor 10 mg daily.

12.  Ventolin 2 puff inhaled every 4 hours as needed.

13.  Prednisone 40 mg daily.

14.  Glucophage 500 mg p.o. t.i.d.

15.  Norvasc 5 mg daily.

16.  Singulair 1 tablet p.o. daily.

 

ALLERGIES TO MEDICATIONS:  Include no known drug allergies.

 

FAMILY HISTORY:  He said both his parents passed away of old age.

 

SOCIAL HISTORY:  He is a former smoker.  He smoked in his teens to his mid 20s.
  He does not smoke anymore.  He rarely drinks alcohol.  Surrogate decision 
maker is his partner, Natalie.

 

REVIEW OF SYSTEMS:  There was a documented fever over the weekend. Denied any 
significant weight change. There was no double vision.  He denies having any 
ear discharge. There was some rhinorrhea. He did admit to having a cough.  He 
did admit arthralgias and myalgias.  He does admit the chest pain with the 
cough. Denies having any orthopnea or nocturnal dyspnea.  He does admit to 
dyspnea on exertion.  No dyspnea at rest.  He did admit having some nausea, but 
no vomiting. He had one episode of diarrhea over the weekend, but none now.  He 
denies any abdominal discomfort.  There is no lightheadedness, no loss of 
consciousness, no pruritus and no skin ulcerations.  Review of 14 systems 
completed, all others negative.

 

                               PHYSICAL EXAMINATION

 

GENERAL:  At this time, Mr. Galvez is a 52-year-old male patient.  He is 
sitting in the ED stretcher.  He does not appear to be in any acute distress.  
He is awake and he is alert.

 

VITAL SIGNS:  Blood pressure 159/96, his pulse is 111, respirations 20, O2 sat 
96%, temperature of 100.5.

 

HEENT:  Head:  Atraumatic.  Eyes:  Sclerae were anicteric and not pale.  Throat
: Oral mucosa appears to be dry.  No oropharyngeal erythema.

 

NECK:  Supple.

 

LUNGS:  He did have some rhonchi in the upper lobes.  He had no wheezing heard.

 

HEART:  Sounds S1, S2.  He is tachycardic.

 

ABDOMEN:  Soft, flat, nontender.  Bowel sounds are present.

 

EXTREMITIES:  Pulses were 2+ throughout.  He is able to move all 4 extremities 
with 5/5 strength.

 

NEUROLOGIC:  The patient is awake, alert, and oriented x3.  Tongue midline.  
 were equal.  He had no gross focal deficits.

 

SKIN:  Intact.

 

 DIAGNOSTIC STUDIES/LAB DATA:  The labs today, WBC of 15.8, RBC of 4.69, 
hemoglobin of 13.8, hematocrit of 41, platelet count of 331.  INR was 0.98, PTT 
of 16.6.  D- Dimer was 244.  Sodium was 130, potassium was 3.3, chloride 91, 
bicarb 30, BUN 15, creatinine 0.91, glucose 199, lactic 1.8, calcium 9.4, total 
bili 0.9.  AST 45, ALT 55, alk phos 69.  Troponin 0.04, CRP of 148.58, BNP at 62
, lipase was 212.  His urine showed 1+ protein, trace ketones.  



He did have a chest x-ray obtained today and on my review I do not appreciate 
any effusions, infiltrates, or pulmonary edema.  Radiology read as no active 
cardiopulmonary disease.  



He did have an EKG obtained today as well, which showed a actually sinus 
tachycardia rate of 100.  No ST elevations or T-wave inversions were noted.  
Old medical records were reviewed.

 

ASSESSMENT AND PLAN:  Mr. Galvez is a 52-year-old male patient, who comes 
into the ED today with complaints of increasing shortness of breath, not 
feeling well, fatigue, decreased appetite.  On evaluation again recently 
diagnosed with influenza at Urgent Care, he will be admitted under inpatient 
status for:

 

1.  Influenza with an asthma exacerbation.  At this point, he is fortunately 
not wheezing and I do not think he need anymore steroids.  I will give him 
inhaled steroids and inhaled nebs, pulmonary toileting, give him his last dose 
of Tamiflu. I am concerned that he may have bacterial sinusitis or possibly 
bronchitis on top of this.  Fortunately, there is no infiltrate on exam, but I 
am going to put him on antibiotics, Rocephin and azithromycin.  We will try to 
get sputum culture if possible and I am concerned he is showing signs of sepsis 
unclear source, as he does have the white count, he is tachycardic, he is a 
little tachypneic in the 20s. I think he got the fluids here in the ED at 30 cc/
kg, got blood cultures, put him on antibiotics Rocephin and azithromycin, and 
continued to treat him supportively and we will follow.  In the setting of his 
illness, we will hold his hydrochlorothiazide, hydrate him and again get him on 
the nebs and antibiotics.  I am holding on the steroid at  this point.

2.  Hypertension.  I will continue his amlodipine, hold the hydrochlorothiazide.

3.  Diabetes.  He will be on lispro sliding scale.

4.  Hyperlipidemia.  Continue statin therapy.

5.  Gastroesophageal reflux disease.  Continue Nexium.

6.  Chronic back pain.  Continue the Nucynta.

7.  Again sepsis.  We will hydrate him.  He will be on broad spectrum 
antibiotics with Rocephin and azithromycin.  Blood cultures had been sent.  We 
will get serial lactates.

8.  Elevated troponin.  This is probably demand ischemia from the flu.  I will 
trend these, if they do go up I will get a cardiology consult and an echo.  We 
will repeat the EKG in the morning and we will place him on telemetry and 
monitor him. He is not having any chest pain with the exception when he coughs 
and his chest is tender, so at this point we will follow.  I am going to get a 
CTA of the chest as well as if he is having worsening shortness of breath, the D
-dimer is mildly elevated.  In addition to this he does have bump in troponin.  
I will make sure he does not have a pulmonary embolism.

9.  Elevated LFTs and elevated lipase.  Again, he is not having abdominal 
discomfort.  This could be just secondary to the acute illness.  We are going 
to trend these.  I will get an ultrasound of the right upper quadrant.  He does 
not appear to be obstructing.  He is not having any pain.  We will give him 
clear liquids, repeat the lipase in the morning and hydrate him and follow this 
closely.

10.  Hypokalemia.  I am going to go ahead and replace this.

11.  DVT prophylaxis.  He will be placed on heparin subcu.

12.  Code status.  He is full code.

13.  Fluids, electrolytes, and nutrition.  He can have clear liquid diet.

 

TIME SPENT:  On admission was 60 minutes, greater than half the time was spent 
face- to-face with the patient obtaining my history and physical, the other 
half the time was spent going over the plan of care with the patient, 
implementing the plan of care.

 

I did discuss the plan of care with my attending, Dr. Sanders, he is in 
agreement.

 

 ____________________________________ ANISH ARRIAGA NP

 

[id number]/CPS #: [id number]

GUSTAVO

## 2018-01-17 NOTE — RAD
Indication: Elevated lipase.



Real-time sonography of the right upper quadrant was performed.



The liver is enlarged measuring 20.9 cm in length. It is diffusely increased in

echogenicity consistent with hepatic steatosis. The gallbladder demonstrates no

gallstones, pericholecystic fluid or wall thickening. The common duct measures up to 4 mm.



The right kidney measures 12.1 x 5.0 x 6.0 cm with no hydronephrosis.



The pancreas head, neck and proximal body demonstrates no mass or pancreatic duct

dilatation. The aorta and inferior vena cava are unremarkable.



IMPRESSION: Hepatomegaly with hepatic steatosis. No biliary duct dilatation is noted.

## 2018-01-18 LAB
BASOPHILS # BLD AUTO: 0 10^3/UL (ref 0–0.2)
EOSINOPHIL # BLD AUTO: 0 10^3/UL (ref 0–0.6)
HCT VFR BLD AUTO: 36 % (ref 42–52)
HGB BLD-MCNC: 11.9 G/DL (ref 14–18)
INR PPP/BLD: 1.1 (ref 0.77–1.02)
LYMPHOCYTES # BLD AUTO: 1 10^3/UL (ref 1–4.8)
MCH RBC QN AUTO: 29 PG (ref 27–31)
MCHC RBC AUTO-ENTMCNC: 33 G/DL (ref 31–36)
MCV RBC AUTO: 88 FL (ref 80–94)
MONOCYTES # BLD AUTO: 0.6 10^3/UL (ref 0–0.8)
NEUTROPHILS # BLD AUTO: 10.6 10^3/UL (ref 1.5–7.7)
NRBC # BLD AUTO: 0 10^3/UL
NRBC BLD QL AUTO: 0
PLATELET # BLD AUTO: 256 10^3/UL (ref 150–450)
RBC # BLD AUTO: 4.07 10^6/UL (ref 4–5.4)
WBC # BLD AUTO: 12.2 10^3/UL (ref 3.5–10.8)

## 2018-01-18 RX ADMIN — CEFTRIAXONE SODIUM SCH: 1 INJECTION, POWDER, FOR SOLUTION INTRAVENOUS at 16:10

## 2018-01-18 RX ADMIN — HEPARIN SODIUM SCH UNITS: 5000 INJECTION INTRAVENOUS; SUBCUTANEOUS at 06:15

## 2018-01-18 RX ADMIN — NYSTATIN SCH: 100000 SUSPENSION ORAL at 15:58

## 2018-01-18 RX ADMIN — INSULIN LISPRO SCH UNIT: 100 INJECTION, SOLUTION INTRAVENOUS; SUBCUTANEOUS at 10:24

## 2018-01-18 RX ADMIN — IPRATROPIUM BROMIDE AND ALBUTEROL SULFATE SCH: .5; 3 SOLUTION RESPIRATORY (INHALATION) at 08:06

## 2018-01-18 RX ADMIN — IPRATROPIUM BROMIDE AND ALBUTEROL SULFATE SCH: .5; 3 SOLUTION RESPIRATORY (INHALATION) at 19:20

## 2018-01-18 RX ADMIN — INSULIN LISPRO SCH UNIT: 100 INJECTION, SOLUTION INTRAVENOUS; SUBCUTANEOUS at 18:39

## 2018-01-18 RX ADMIN — MONTELUKAST SODIUM SCH MG: 10 TABLET, COATED ORAL at 10:25

## 2018-01-18 RX ADMIN — IPRATROPIUM BROMIDE AND ALBUTEROL SULFATE SCH NEB: .5; 3 SOLUTION RESPIRATORY (INHALATION) at 14:06

## 2018-01-18 RX ADMIN — IPRATROPIUM BROMIDE AND ALBUTEROL SULFATE SCH: .5; 3 SOLUTION RESPIRATORY (INHALATION) at 01:19

## 2018-01-18 RX ADMIN — AZITHROMYCIN SCH MLS/HR: 500 INJECTION, POWDER, LYOPHILIZED, FOR SOLUTION INTRAVENOUS at 15:52

## 2018-01-18 RX ADMIN — ATORVASTATIN CALCIUM SCH MG: 10 TABLET, FILM COATED ORAL at 10:25

## 2018-01-18 RX ADMIN — AMLODIPINE BESYLATE SCH MG: 5 TABLET ORAL at 10:25

## 2018-01-18 RX ADMIN — OMEPRAZOLE SCH MG: 20 CAPSULE, DELAYED RELEASE ORAL at 10:25

## 2018-01-18 RX ADMIN — SODIUM CHLORIDE SCH MLS/HR: 900 IRRIGANT IRRIGATION at 02:40

## 2018-01-18 RX ADMIN — TAPENTADOL HYDROCHLORIDE SCH MG: 50 TABLET, FILM COATED ORAL at 15:59

## 2018-01-18 RX ADMIN — TAPENTADOL HYDROCHLORIDE SCH MG: 50 TABLET, FILM COATED ORAL at 10:25

## 2018-01-18 RX ADMIN — TAPENTADOL HYDROCHLORIDE SCH MG: 50 TABLET, FILM COATED ORAL at 20:55

## 2018-01-18 RX ADMIN — MOMETASONE FUROATE AND FORMOTEROL FUMARATE DIHYDRATE SCH: 200; 5 AEROSOL RESPIRATORY (INHALATION) at 19:20

## 2018-01-18 RX ADMIN — HEPARIN SODIUM SCH UNITS: 5000 INJECTION INTRAVENOUS; SUBCUTANEOUS at 20:56

## 2018-01-18 RX ADMIN — INSULIN LISPRO SCH: 100 INJECTION, SOLUTION INTRAVENOUS; SUBCUTANEOUS at 12:59

## 2018-01-18 RX ADMIN — CITALOPRAM SCH MG: 40 TABLET ORAL at 20:56

## 2018-01-18 RX ADMIN — CEFTRIAXONE SODIUM SCH MLS/HR: 1 INJECTION, POWDER, FOR SOLUTION INTRAVENOUS at 13:09

## 2018-01-18 RX ADMIN — MOMETASONE FUROATE AND FORMOTEROL FUMARATE DIHYDRATE SCH PUFF: 200; 5 AEROSOL RESPIRATORY (INHALATION) at 08:09

## 2018-01-18 RX ADMIN — NYSTATIN SCH UNITS: 100000 SUSPENSION ORAL at 20:55

## 2018-01-18 RX ADMIN — NYSTATIN SCH UNITS: 100000 SUSPENSION ORAL at 16:00

## 2018-01-18 RX ADMIN — HEPARIN SODIUM SCH UNITS: 5000 INJECTION INTRAVENOUS; SUBCUTANEOUS at 15:59

## 2018-01-18 NOTE — RAD
INDICATION: Chest pain. Short of breath. Evaluate for pulmonary embolus.      



COMPARISON: Chest x-ray January 17, 2018

 

TECHNIQUE: Axial source images were obtained from the thoracic inlet to the hemidiaphragms

following administration of 93 mL Visipaque 320. CT angiographic technique was utilized.

Coronal and sagittal reconstructed images were acquired.



CHEST FINDINGS:



Neck/thyroid: The visualized neck to include the thyroid appear normal.



Chest wall: There are no acute abnormalities of the bony thorax or chest wall. There is no

supraclavicular, infraclavicular, or axillary lymphadenopathy.



Lungs : There are bibasilar infiltrates with consolidation. These have a nodular

configuration. Suggest follow-up to document resolution. The upper lung fields are clear.

There are no endobronchial lesions.



Cardiomediastinal structures: There is no CT evidence of acute pulmonary embolic disease.



The heart is normal in size. There is no pericardial effusion.  There is no evidence of

aortic aneurysm or dissection. There is no mediastinal or hilar adenopathy. The esophagus

appears normal.



Pleura : There are no pleural-based masses or effusions.



Other: There is hepatic steatosis with hepatomegaly.



IMPRESSION:  NO CT EVIDENCE OF ACUTE PULMONARY EMBOLIC DISEASE. BIBASILAR INFILTRATES WITH

CONSOLIDATION. SUGGEST FOLLOW-UP.

## 2018-01-18 NOTE — PN
Subjective


Date of Service: 01/18/18


Interval History: 





Feels like he needs his cpap this AM to sleep. Talking in full sentences. No 

other complaints. 





Objective


Active Medications: 








Acetaminophen (Tylenol Tab*)  650 mg PO Q4H PRN


   PRN Reason: FEVER/PAIN


   Last Admin: 01/17/18 13:22 Dose:  650 mg


Acetaminophen/Codeine Phosphate (Tylenol/Codeine 30 Mg Tab*)  1 tab PO Q6H PRN


   PRN Reason: COUGH


Albuterol/Ipratropium (Duoneb (Albuterol 2.5 Mg/Ipratropium 0.5 Mg))  1 neb INH 

[id number] AWAKE [facility]


   Last Admin: 01/18/18 14:06 Dose:  1 neb


Amlodipine Besylate (Norvasc Tab*)  5 mg PO DAILY [facility]


   Last Admin: 01/18/18 10:25 Dose:  5 mg


Atorvastatin Calcium (Lipitor*)  10 mg PO DAILY [facility]


   Last Admin: 01/18/18 10:25 Dose:  10 mg


Cetirizine HCl (Zyrtec*)  10 mg PO DAILY PRN; Protocol


   PRN Reason: Allergy Symptoms


Citalopram Hydrobromide (Celexa Tab*)  40 mg PO BEDTIME [facility]


   Last Admin: 01/17/18 23:30 Dose:  40 mg


Dextrose (D50w Syringe 50 Ml*)  12.5 gm IV PUSH .FOR FS < 60 - SS PRN


   PRN Reason: FS < 60


Heparin Sodium (Porcine) (Heparin Vial(*))  5,000 units SUBCUT Q8HR [facility]


   Last Admin: 01/18/18 15:59 Dose:  5,000 units


Sodium Chloride (Ns 0.9% 1000 Ml*)  1,000 mls @ 100 mls/hr IV PER RATE [facility]


   Stop: 01/18/18 22:14


   Last Admin: 01/18/18 02:40 Dose:  100 mls/hr


Ceftriaxone Sodium 1 gm/ (Sodium Chloride)  50 mls @ 200 mls/hr IVPB Q24H [facility]


   Last Admin: 01/18/18 13:09 Dose:  200 mls/hr


Azithromycin 500 mg/ Sodium (Chloride)  250 mls @ 250 mls/hr IVPB Q24HR@1300 [facility]


   Last Admin: 01/18/18 15:52 Dose:  250 mls/hr


Insulin Human Lispro (Humalog*)  0 units SUBCUT AC [facility]


   PRN Reason: Protocol


   Last Admin: 01/18/18 18:39 Dose:  3 unit


Mometasone Furoate/Formoterol Fumar (Dulera 200/5 Mdi*)  2 puff INH BID [facility]


   PRN Reason: Protocol


   Last Admin: 01/18/18 08:09 Dose:  2 puff


Montelukast Sodium (Singulair Tab*)  10 mg PO DAILY [facility]


   Last Admin: 01/18/18 10:25 Dose:  10 mg


Nystatin (Nystatin Suspension*)  200,000 units PO QID [facility]


   Last Admin: 01/18/18 16:00 Dose:  200,000 units


Omeprazole (Prilosec Cap*)  20 mg PO DAILY [facility]


   PRN Reason: Protocol


   Last Admin: 01/18/18 10:25 Dose:  20 mg


Ondansetron HCl (Zofran Inj*)  4 mg IV Q6H PRN


   PRN Reason: NAUSEA


Tapentadol (Nucynta(Nf))  100 mg PO TID [facility]


   Last Admin: 01/18/18 15:59 Dose:  100 mg








 Vital Signs - 8 hr











  01/18/18 01/18/18





  14:09 15:59


 


Pulse Rate 95 


 


Respiratory 16 18





Rate  


 


O2 Sat by Pulse 95 





Oximetry  











Oxygen Devices in Use Now: Nasal Cannula, CPAP


Appearance: sleepy, NAD


Eyes: No Scleral Icterus, PERRLA


Ears/Nose/Mouth/Throat: - - +thrush


Neck: NL Appearance and Movements; NL JVP, Trachea Midline


Respiratory: Symmetrical Chest Expansion and Respiratory Effort, - - decreased 

throughout, rhonchi in bases, end expiratory wheezes


Cardiovascular: RRR


Abdominal: NL Sounds; No Tenderness; No Distention, No Hepatosplenomegaly


Extremities: No Edema


Skin: No Rash or Ulcers


Neurological: Alert and Oriented x 3


Result Diagrams: 


 01/18/18 05:28





 01/18/18 05:27


Additional Lab and Data: 


 Lab Results











  01/17/18 01/17/18 01/17/18 Range/Units





  08:19 08:19 08:19 


 


WBC     (3.5-10.8)  10^3/ul


 


RBC     (4.0-5.4)  10^6/ul


 


Hgb     (14.0-18.0)  g/dl


 


Hct     (42-52)  %


 


MCV     (80-94)  fL


 


MCH     (27-31)  pg


 


MCHC     (31-36)  g/dl


 


RDW     (10.5-15)  %


 


Plt Count     (150-450)  10^3/ul


 


MPV     (7.4-10.4)  um3


 


Neut % (Auto)     (38-83)  %


 


Lymph % (Auto)     (25-47)  %


 


Mono % (Auto)     (1-9)  %


 


Eos % (Auto)     (0-6)  %


 


Baso % (Auto)     (0-2)  %


 


Absolute Neuts (auto)     (1.5-7.7)  10^3/ul


 


Absolute Lymphs (auto)     (1.0-4.8)  10^3/ul


 


Absolute Monos (auto)     (0-0.8)  10^3/ul


 


Absolute Eos (auto)     (0-0.6)  10^3/ul


 


Absolute Basos (auto)     (0-0.2)  10^3/ul


 


Absolute Nucleated RBC     10^3/ul


 


Nucleated RBC %     


 


INR (Anticoag Therapy)  0.98    (0.77-1.02)  


 


APTT  16.6 L    (26.0-36.3)  seconds


 


D-Dimer, Quantitative  244 H    (Less Than 230)  ng/mL


 


Sodium    130 L  (133-145)  mmol/L


 


Potassium    TNP  


 


Chloride    91 L  (101-111)  mmol/L


 


Carbon Dioxide    30  (22-32)  mmol/L


 


Anion Gap    9  (2-11)  mmol/L


 


BUN    15  (6-24)  mg/dL


 


Creatinine    0.91  (0.67-1.17)  mg/dL


 


Est GFR ( Amer)    113.0  (>60)  


 


Est GFR (Non-Af Amer)    87.8  (>60)  


 


BUN/Creatinine Ratio    16.5  (8-20)  


 


Glucose    199 H  ()  mg/dL


 


Lactic Acid     (0.5-2.0)  mmol/L


 


Calcium    9.4  (8.6-10.3)  mg/dL


 


Total Bilirubin    0.90  (0.2-1.0)  mg/dL


 


AST    TNP  


 


ALT    55 H  (7-52)  U/L


 


Alkaline Phosphatase    69  ()  U/L


 


Troponin I    0.04 H*  (<0.04)  ng/mL


 


C-Reactive Protein    148.59 H  (< 5.00)  mg/L


 


B-Natriuretic Peptide   62  ( - 100) pg/mL


 


Total Protein    7.7  (6.4-8.9)  g/dL


 


Albumin    4.2  (3.2-5.2)  g/dL


 


Globulin    3.5  (2-4)  g/dL


 


Albumin/Globulin Ratio    1.2  (1-3)  


 


Lipase    212 H  (11.0-82.0)  U/L


 


Urine Color     


 


Urine Appearance     


 


Urine pH     (5-9)  


 


Ur Specific Gravity     (1.010-1.030)  


 


Urine Protein     (Negative)  


 


Urine Ketones     (Negative)  


 


Urine Blood     (Negative)  


 


Urine Nitrate     (Negative)  


 


Urine Bilirubin     (Negative)  


 


Urine Urobilinogen     (Negative)  


 


Ur Leukocyte Esterase     (Negative)  


 


Urine WBC (Auto)     (Absent)  


 


Urine RBC (Auto)     (Absent)  


 


Urine Bacteria     (Absent)  


 


Urine Glucose     (Negative)  














  01/17/18 01/17/18 01/17/18 Range/Units





  08:19 08:19 09:02 


 


WBC  15.8 H    (3.5-10.8)  10^3/ul


 


RBC  4.69    (4.0-5.4)  10^6/ul


 


Hgb  13.8 L    (14.0-18.0)  g/dl


 


Hct  41 L    (42-52)  %


 


MCV  88    (80-94)  fL


 


MCH  29    (27-31)  pg


 


MCHC  33    (31-36)  g/dl


 


RDW  16 H    (10.5-15)  %


 


Plt Count  331    (150-450)  10^3/ul


 


MPV  8    (7.4-10.4)  um3


 


Neut % (Auto)  86.8 H    (38-83)  %


 


Lymph % (Auto)  7.9 L    (25-47)  %


 


Mono % (Auto)  5.0    (1-9)  %


 


Eos % (Auto)  0    (0-6)  %


 


Baso % (Auto)  0.3    (0-2)  %


 


Absolute Neuts (auto)  13.7 H    (1.5-7.7)  10^3/ul


 


Absolute Lymphs (auto)  1.3    (1.0-4.8)  10^3/ul


 


Absolute Monos (auto)  0.8    (0-0.8)  10^3/ul


 


Absolute Eos (auto)  0    (0-0.6)  10^3/ul


 


Absolute Basos (auto)  0    (0-0.2)  10^3/ul


 


Absolute Nucleated RBC  0    10^3/ul


 


Nucleated RBC %  0    


 


INR (Anticoag Therapy)     (0.77-1.02)  


 


APTT     (26.0-36.3)  seconds


 


D-Dimer, Quantitative     (Less Than 230)  ng/mL


 


Sodium     (133-145)  mmol/L


 


Potassium     


 


Chloride     (101-111)  mmol/L


 


Carbon Dioxide     (22-32)  mmol/L


 


Anion Gap     (2-11)  mmol/L


 


BUN     (6-24)  mg/dL


 


Creatinine     (0.67-1.17)  mg/dL


 


Est GFR ( Amer)     (>60)  


 


Est GFR (Non-Af Amer)     (>60)  


 


BUN/Creatinine Ratio     (8-20)  


 


Glucose     ()  mg/dL


 


Lactic Acid   1.8   (0.5-2.0)  mmol/L


 


Calcium     (8.6-10.3)  mg/dL


 


Total Bilirubin     (0.2-1.0)  mg/dL


 


AST     


 


ALT     (7-52)  U/L


 


Alkaline Phosphatase     ()  U/L


 


Troponin I     (<0.04)  ng/mL


 


C-Reactive Protein     (< 5.00)  mg/L


 


B-Natriuretic Peptide    ( - 100) pg/mL


 


Total Protein     (6.4-8.9)  g/dL


 


Albumin     (3.2-5.2)  g/dL


 


Globulin     (2-4)  g/dL


 


Albumin/Globulin Ratio     (1-3)  


 


Lipase     (11.0-82.0)  U/L


 


Urine Color    Yellow  


 


Urine Appearance    Clear  


 


Urine pH    7.0  (5-9)  


 


Ur Specific Gravity    1.012  (1.010-1.030)  


 


Urine Protein    1+(30 mg/dl) H  (Negative)  


 


Urine Ketones    Trace H  (Negative)  


 


Urine Blood    Negative  (Negative)  


 


Urine Nitrate    Negative  (Negative)  


 


Urine Bilirubin    Negative  (Negative)  


 


Urine Urobilinogen    Negative  (Negative)  


 


Ur Leukocyte Esterase    Negative  (Negative)  


 


Urine WBC (Auto)    Trace(0-5/hpf)  (Absent)  


 


Urine RBC (Auto)    Trace(0-2/hpf)  (Absent)  


 


Urine Bacteria    Absent  (Absent)  


 


Urine Glucose    Negative  (Negative)  














  01/17/18 01/17/18 Range/Units





  10:00 11:55 


 


WBC    (3.5-10.8)  10^3/ul


 


RBC    (4.0-5.4)  10^6/ul


 


Hgb    (14.0-18.0)  g/dl


 


Hct    (42-52)  %


 


MCV    (80-94)  fL


 


MCH    (27-31)  pg


 


MCHC    (31-36)  g/dl


 


RDW    (10.5-15)  %


 


Plt Count    (150-450)  10^3/ul


 


MPV    (7.4-10.4)  um3


 


Neut % (Auto)    (38-83)  %


 


Lymph % (Auto)    (25-47)  %


 


Mono % (Auto)    (1-9)  %


 


Eos % (Auto)    (0-6)  %


 


Baso % (Auto)    (0-2)  %


 


Absolute Neuts (auto)    (1.5-7.7)  10^3/ul


 


Absolute Lymphs (auto)    (1.0-4.8)  10^3/ul


 


Absolute Monos (auto)    (0-0.8)  10^3/ul


 


Absolute Eos (auto)    (0-0.6)  10^3/ul


 


Absolute Basos (auto)    (0-0.2)  10^3/ul


 


Absolute Nucleated RBC    10^3/ul


 


Nucleated RBC %    


 


INR (Anticoag Therapy)    (0.77-1.02)  


 


APTT    (26.0-36.3)  seconds


 


D-Dimer, Quantitative    (Less Than 230)  ng/mL


 


Sodium    (133-145)  mmol/L


 


Potassium  3.3 L   


 


Chloride    (101-111)  mmol/L


 


Carbon Dioxide    (22-32)  mmol/L


 


Anion Gap    (2-11)  mmol/L


 


BUN    (6-24)  mg/dL


 


Creatinine    (0.67-1.17)  mg/dL


 


Est GFR ( Amer)    (>60)  


 


Est GFR (Non-Af Amer)    (>60)  


 


BUN/Creatinine Ratio    (8-20)  


 


Glucose    ()  mg/dL


 


Lactic Acid   2.0  (0.5-2.0)  mmol/L


 


Calcium    (8.6-10.3)  mg/dL


 


Total Bilirubin    (0.2-1.0)  mg/dL


 


AST  45 H   


 


ALT    (7-52)  U/L


 


Alkaline Phosphatase    ()  U/L


 


Troponin I    (<0.04)  ng/mL


 


C-Reactive Protein    (< 5.00)  mg/L


 


B-Natriuretic Peptide   ( - 100) pg/mL


 


Total Protein    (6.4-8.9)  g/dL


 


Albumin    (3.2-5.2)  g/dL


 


Globulin    (2-4)  g/dL


 


Albumin/Globulin Ratio    (1-3)  


 


Lipase    (11.0-82.0)  U/L


 


Urine Color    


 


Urine Appearance    


 


Urine pH    (5-9)  


 


Ur Specific Gravity    (1.010-1.030)  


 


Urine Protein    (Negative)  


 


Urine Ketones    (Negative)  


 


Urine Blood    (Negative)  


 


Urine Nitrate    (Negative)  


 


Urine Bilirubin    (Negative)  


 


Urine Urobilinogen    (Negative)  


 


Ur Leukocyte Esterase    (Negative)  


 


Urine WBC (Auto)    (Absent)  


 


Urine RBC (Auto)    (Absent)  


 


Urine Bacteria    (Absent)  


 


Urine Glucose    (Negative)  











Microbiology and Other Data: 


 Microbiology











 01/17/18 14:37 Influenza Types A,B Antigen ([facility]) - Final





 Nasal    Specimen received for Influenza A/B Molecular testing














Assess/Plan/Problems-Billing


Assessment: 





53 yo M p/w fatigue and cough s/p influenza B found with b/l lower lobe 

consolidations





- Patient Problems


(1) Acute respiratory failure with hypoxia


Comment: Multifactoral 


Influenza and asthma and PNA


CTX and azithro


Steroids


Tamiflu course completed


dulera, albuterol    





(2) Diabetes


Comment: lispro SS   





(3) Thrush


Comment: nystatin   





(4) Obstructive sleep apnea


Comment: CPAP   





(5) DVT prophylaxis


Comment: HSQ

## 2018-01-19 LAB
BASOPHILS # BLD AUTO: 0 10^3/UL (ref 0–0.2)
EOSINOPHIL # BLD AUTO: 0.1 10^3/UL (ref 0–0.6)
HCT VFR BLD AUTO: 35 % (ref 42–52)
HGB BLD-MCNC: 11.5 G/DL (ref 14–18)
LYMPHOCYTES # BLD AUTO: 1.3 10^3/UL (ref 1–4.8)
MCH RBC QN AUTO: 29 PG (ref 27–31)
MCHC RBC AUTO-ENTMCNC: 33 G/DL (ref 31–36)
MCV RBC AUTO: 87 FL (ref 80–94)
MONOCYTES # BLD AUTO: 0.8 10^3/UL (ref 0–0.8)
NEUTROPHILS # BLD AUTO: 8.9 10^3/UL (ref 1.5–7.7)
NRBC # BLD AUTO: 0 10^3/UL
NRBC BLD QL AUTO: 0
PLATELET # BLD AUTO: 244 10^3/UL (ref 150–450)
RBC # BLD AUTO: 3.95 10^6/UL (ref 4–5.4)
WBC # BLD AUTO: 11.1 10^3/UL (ref 3.5–10.8)

## 2018-01-19 RX ADMIN — IPRATROPIUM BROMIDE AND ALBUTEROL SULFATE SCH NEB: .5; 3 SOLUTION RESPIRATORY (INHALATION) at 19:17

## 2018-01-19 RX ADMIN — MOMETASONE FUROATE AND FORMOTEROL FUMARATE DIHYDRATE SCH PUFF: 200; 5 AEROSOL RESPIRATORY (INHALATION) at 19:18

## 2018-01-19 RX ADMIN — AMLODIPINE BESYLATE SCH MG: 5 TABLET ORAL at 08:48

## 2018-01-19 RX ADMIN — HEPARIN SODIUM SCH UNITS: 5000 INJECTION INTRAVENOUS; SUBCUTANEOUS at 13:49

## 2018-01-19 RX ADMIN — NYSTATIN SCH UNITS: 100000 SUSPENSION ORAL at 17:48

## 2018-01-19 RX ADMIN — TAPENTADOL HYDROCHLORIDE SCH MG: 50 TABLET, FILM COATED ORAL at 20:54

## 2018-01-19 RX ADMIN — AZITHROMYCIN SCH MLS/HR: 500 INJECTION, POWDER, LYOPHILIZED, FOR SOLUTION INTRAVENOUS at 13:49

## 2018-01-19 RX ADMIN — IPRATROPIUM BROMIDE AND ALBUTEROL SULFATE SCH: .5; 3 SOLUTION RESPIRATORY (INHALATION) at 01:15

## 2018-01-19 RX ADMIN — IPRATROPIUM BROMIDE AND ALBUTEROL SULFATE SCH NEB: .5; 3 SOLUTION RESPIRATORY (INHALATION) at 13:26

## 2018-01-19 RX ADMIN — MONTELUKAST SODIUM SCH MG: 10 TABLET, COATED ORAL at 08:48

## 2018-01-19 RX ADMIN — NYSTATIN SCH UNITS: 100000 SUSPENSION ORAL at 20:55

## 2018-01-19 RX ADMIN — OMEPRAZOLE SCH MG: 20 CAPSULE, DELAYED RELEASE ORAL at 08:50

## 2018-01-19 RX ADMIN — HEPARIN SODIUM SCH UNITS: 5000 INJECTION INTRAVENOUS; SUBCUTANEOUS at 05:10

## 2018-01-19 RX ADMIN — HEPARIN SODIUM SCH UNITS: 5000 INJECTION INTRAVENOUS; SUBCUTANEOUS at 20:55

## 2018-01-19 RX ADMIN — TAPENTADOL HYDROCHLORIDE SCH MG: 50 TABLET, FILM COATED ORAL at 13:48

## 2018-01-19 RX ADMIN — NYSTATIN SCH UNITS: 100000 SUSPENSION ORAL at 13:49

## 2018-01-19 RX ADMIN — MOMETASONE FUROATE AND FORMOTEROL FUMARATE DIHYDRATE SCH PUFF: 200; 5 AEROSOL RESPIRATORY (INHALATION) at 07:35

## 2018-01-19 RX ADMIN — TAPENTADOL HYDROCHLORIDE SCH MG: 50 TABLET, FILM COATED ORAL at 08:48

## 2018-01-19 RX ADMIN — CITALOPRAM SCH MG: 40 TABLET ORAL at 20:55

## 2018-01-19 RX ADMIN — INSULIN LISPRO SCH UNIT: 100 INJECTION, SOLUTION INTRAVENOUS; SUBCUTANEOUS at 17:47

## 2018-01-19 RX ADMIN — IPRATROPIUM BROMIDE AND ALBUTEROL SULFATE SCH NEB: .5; 3 SOLUTION RESPIRATORY (INHALATION) at 07:34

## 2018-01-19 RX ADMIN — ATORVASTATIN CALCIUM SCH MG: 10 TABLET, FILM COATED ORAL at 08:49

## 2018-01-19 RX ADMIN — NYSTATIN SCH UNITS: 100000 SUSPENSION ORAL at 08:48

## 2018-01-19 RX ADMIN — INSULIN LISPRO SCH UNIT: 100 INJECTION, SOLUTION INTRAVENOUS; SUBCUTANEOUS at 08:47

## 2018-01-19 RX ADMIN — CEFTRIAXONE SODIUM SCH MLS/HR: 1 INJECTION, POWDER, FOR SOLUTION INTRAVENOUS at 12:29

## 2018-01-19 RX ADMIN — INSULIN LISPRO SCH UNIT: 100 INJECTION, SOLUTION INTRAVENOUS; SUBCUTANEOUS at 12:23

## 2018-01-19 NOTE — PN
Subjective


Date of Service: 01/19/18


Interval History: 





Feels breathing improved


+fatigue 


No other complaints 





Up in chair but still seems relatively sleepy. He indicated this is not far 

from his baseline. I wonder if his chronic pain medications are also 

contributing  





Objective


Active Medications: 








Acetaminophen (Tylenol Tab*)  650 mg PO Q4H PRN


   PRN Reason: FEVER/PAIN


   Last Admin: 01/17/18 13:22 Dose:  650 mg


Acetaminophen/Codeine Phosphate (Tylenol/Codeine 30 Mg Tab*)  1 tab PO Q6H PRN


   PRN Reason: COUGH


Albuterol/Ipratropium (Duoneb (Albuterol 2.5 Mg/Ipratropium 0.5 Mg))  1 neb INH 

[id number] AWAKE [facility]


   Last Admin: 01/19/18 13:26 Dose:  1 neb


Amlodipine Besylate (Norvasc Tab*)  5 mg PO DAILY [facility]


   Last Admin: 01/19/18 08:48 Dose:  5 mg


Atorvastatin Calcium (Lipitor*)  10 mg PO DAILY [facility]


   Last Admin: 01/19/18 08:49 Dose:  10 mg


Cetirizine HCl (Zyrtec*)  10 mg PO DAILY PRN; Protocol


   PRN Reason: Allergy Symptoms


Citalopram Hydrobromide (Celexa Tab*)  40 mg PO BEDTIME [facility]


   Last Admin: 01/18/18 20:56 Dose:  40 mg


Dextrose (D50w Syringe 50 Ml*)  12.5 gm IV PUSH .FOR FS < 60 - SS PRN


   PRN Reason: FS < 60


Heparin Sodium (Porcine) (Heparin Vial(*))  5,000 units SUBCUT Q8HR [facility]


   Last Admin: 01/19/18 13:49 Dose:  5,000 units


Ceftriaxone Sodium 1 gm/ (Sodium Chloride)  50 mls @ 200 mls/hr IVPB Q24H [facility]


   Last Admin: 01/19/18 12:29 Dose:  200 mls/hr


Azithromycin 500 mg/ Sodium (Chloride)  250 mls @ 250 mls/hr IVPB Q24HR@1300 [facility]


   Last Admin: 01/19/18 13:49 Dose:  250 mls/hr


Insulin Human Lispro (Humalog*)  0 units SUBCUT AC [facility]


   PRN Reason: Protocol


   Last Admin: 01/19/18 12:23 Dose:  6 unit


Mometasone Furoate/Formoterol Fumar (Dulera 200/5 Mdi*)  2 puff INH BID [facility]


   PRN Reason: Protocol


   Last Admin: 01/19/18 07:35 Dose:  2 puff


Montelukast Sodium (Singulair Tab*)  10 mg PO DAILY [facility]


   Last Admin: 01/19/18 08:48 Dose:  10 mg


Nystatin (Nystatin Suspension*)  200,000 units PO QID [facility]


   Last Admin: 01/19/18 13:49 Dose:  200,000 units


Omeprazole (Prilosec Cap*)  20 mg PO DAILY [facility]


   PRN Reason: Protocol


   Last Admin: 01/19/18 08:50 Dose:  20 mg


Ondansetron HCl (Zofran Inj*)  4 mg IV Q6H PRN


   PRN Reason: NAUSEA


Potassium Chloride (Klor Con Er Tab*)  40 meq PO ONCE ONE


   Stop: 01/19/18 15:52


Tapentadol (Nucynta(Nf))  100 mg PO TID [facility]


   Last Admin: 01/19/18 13:48 Dose:  100 mg








 Vital Signs - 8 hr











  01/19/18 01/19/18 01/19/18





  08:00 08:20 08:48


 


Temperature  99.1 F 


 


Pulse Rate  95 


 


Respiratory 18 16 17





Rate   


 


Blood Pressure  149/95 





(mmHg)   


 


O2 Sat by Pulse  94 





Oximetry   














  01/19/18 01/19/18 01/19/18





  10:48 11:42 13:28


 


Temperature  98.7 F 


 


Pulse Rate  85 90


 


Respiratory 16 16 16





Rate   


 


Blood Pressure  132/87 





(mmHg)   


 


O2 Sat by Pulse  93 98





Oximetry   














  01/19/18





  13:48


 


Temperature 


 


Pulse Rate 


 


Respiratory 16





Rate 


 


Blood Pressure 





(mmHg) 


 


O2 Sat by Pulse 





Oximetry 











Oxygen Devices in Use Now: None


Appearance: sitting in chair, drowsy, interactive, talks in full sentences, NAD


Eyes: No Scleral Icterus, PERRLA


Ears/Nose/Mouth/Throat: Mucous Membranes Moist, - - thrush largely gone


Neck: NL Appearance and Movements; NL JVP, Trachea Midline


Respiratory: Symmetrical Chest Expansion and Respiratory Effort, - - decreased 

in bases with b/l rales up 1/4


Cardiovascular: NL Sounds; No Murmurs; No JVD, RRR


Abdominal: NL Sounds; No Tenderness; No Distention, No Hepatosplenomegaly


Lymphatic: No Cervical Adenopathy, No Axillary Adenopathy


Extremities: No Edema


Skin: No Rash or Ulcers


Neurological: Alert and Oriented x 3


Result Diagrams: 


 01/19/18 06:58





 01/19/18 06:58


Additional Lab and Data: 


 Lab Results











  01/17/18 01/17/18 01/17/18 Range/Units





  08:19 08:19 08:19 


 


WBC     (3.5-10.8)  10^3/ul


 


RBC     (4.0-5.4)  10^6/ul


 


Hgb     (14.0-18.0)  g/dl


 


Hct     (42-52)  %


 


MCV     (80-94)  fL


 


MCH     (27-31)  pg


 


MCHC     (31-36)  g/dl


 


RDW     (10.5-15)  %


 


Plt Count     (150-450)  10^3/ul


 


MPV     (7.4-10.4)  um3


 


Neut % (Auto)     (38-83)  %


 


Lymph % (Auto)     (25-47)  %


 


Mono % (Auto)     (1-9)  %


 


Eos % (Auto)     (0-6)  %


 


Baso % (Auto)     (0-2)  %


 


Absolute Neuts (auto)     (1.5-7.7)  10^3/ul


 


Absolute Lymphs (auto)     (1.0-4.8)  10^3/ul


 


Absolute Monos (auto)     (0-0.8)  10^3/ul


 


Absolute Eos (auto)     (0-0.6)  10^3/ul


 


Absolute Basos (auto)     (0-0.2)  10^3/ul


 


Absolute Nucleated RBC     10^3/ul


 


Nucleated RBC %     


 


INR (Anticoag Therapy)  0.98    (0.77-1.02)  


 


APTT  16.6 L    (26.0-36.3)  seconds


 


D-Dimer, Quantitative  244 H    (Less Than 230)  ng/mL


 


Sodium    130 L  (133-145)  mmol/L


 


Potassium    TNP  


 


Chloride    91 L  (101-111)  mmol/L


 


Carbon Dioxide    30  (22-32)  mmol/L


 


Anion Gap    9  (2-11)  mmol/L


 


BUN    15  (6-24)  mg/dL


 


Creatinine    0.91  (0.67-1.17)  mg/dL


 


Est GFR ( Amer)    113.0  (>60)  


 


Est GFR (Non-Af Amer)    87.8  (>60)  


 


BUN/Creatinine Ratio    16.5  (8-20)  


 


Glucose    199 H  ()  mg/dL


 


Lactic Acid     (0.5-2.0)  mmol/L


 


Calcium    9.4  (8.6-10.3)  mg/dL


 


Total Bilirubin    0.90  (0.2-1.0)  mg/dL


 


AST    TNP  


 


ALT    55 H  (7-52)  U/L


 


Alkaline Phosphatase    69  ()  U/L


 


Troponin I    0.04 H*  (<0.04)  ng/mL


 


C-Reactive Protein    148.59 H  (< 5.00)  mg/L


 


B-Natriuretic Peptide   62  ( - 100) pg/mL


 


Total Protein    7.7  (6.4-8.9)  g/dL


 


Albumin    4.2  (3.2-5.2)  g/dL


 


Globulin    3.5  (2-4)  g/dL


 


Albumin/Globulin Ratio    1.2  (1-3)  


 


Lipase    212 H  (11.0-82.0)  U/L


 


Urine Color     


 


Urine Appearance     


 


Urine pH     (5-9)  


 


Ur Specific Gravity     (1.010-1.030)  


 


Urine Protein     (Negative)  


 


Urine Ketones     (Negative)  


 


Urine Blood     (Negative)  


 


Urine Nitrate     (Negative)  


 


Urine Bilirubin     (Negative)  


 


Urine Urobilinogen     (Negative)  


 


Ur Leukocyte Esterase     (Negative)  


 


Urine WBC (Auto)     (Absent)  


 


Urine RBC (Auto)     (Absent)  


 


Urine Bacteria     (Absent)  


 


Urine Glucose     (Negative)  














  01/17/18 01/17/18 01/17/18 Range/Units





  08:19 08:19 09:02 


 


WBC  15.8 H    (3.5-10.8)  10^3/ul


 


RBC  4.69    (4.0-5.4)  10^6/ul


 


Hgb  13.8 L    (14.0-18.0)  g/dl


 


Hct  41 L    (42-52)  %


 


MCV  88    (80-94)  fL


 


MCH  29    (27-31)  pg


 


MCHC  33    (31-36)  g/dl


 


RDW  16 H    (10.5-15)  %


 


Plt Count  331    (150-450)  10^3/ul


 


MPV  8    (7.4-10.4)  um3


 


Neut % (Auto)  86.8 H    (38-83)  %


 


Lymph % (Auto)  7.9 L    (25-47)  %


 


Mono % (Auto)  5.0    (1-9)  %


 


Eos % (Auto)  0    (0-6)  %


 


Baso % (Auto)  0.3    (0-2)  %


 


Absolute Neuts (auto)  13.7 H    (1.5-7.7)  10^3/ul


 


Absolute Lymphs (auto)  1.3    (1.0-4.8)  10^3/ul


 


Absolute Monos (auto)  0.8    (0-0.8)  10^3/ul


 


Absolute Eos (auto)  0    (0-0.6)  10^3/ul


 


Absolute Basos (auto)  0    (0-0.2)  10^3/ul


 


Absolute Nucleated RBC  0    10^3/ul


 


Nucleated RBC %  0    


 


INR (Anticoag Therapy)     (0.77-1.02)  


 


APTT     (26.0-36.3)  seconds


 


D-Dimer, Quantitative     (Less Than 230)  ng/mL


 


Sodium     (133-145)  mmol/L


 


Potassium     


 


Chloride     (101-111)  mmol/L


 


Carbon Dioxide     (22-32)  mmol/L


 


Anion Gap     (2-11)  mmol/L


 


BUN     (6-24)  mg/dL


 


Creatinine     (0.67-1.17)  mg/dL


 


Est GFR ( Amer)     (>60)  


 


Est GFR (Non-Af Amer)     (>60)  


 


BUN/Creatinine Ratio     (8-20)  


 


Glucose     ()  mg/dL


 


Lactic Acid   1.8   (0.5-2.0)  mmol/L


 


Calcium     (8.6-10.3)  mg/dL


 


Total Bilirubin     (0.2-1.0)  mg/dL


 


AST     


 


ALT     (7-52)  U/L


 


Alkaline Phosphatase     ()  U/L


 


Troponin I     (<0.04)  ng/mL


 


C-Reactive Protein     (< 5.00)  mg/L


 


B-Natriuretic Peptide    ( - 100) pg/mL


 


Total Protein     (6.4-8.9)  g/dL


 


Albumin     (3.2-5.2)  g/dL


 


Globulin     (2-4)  g/dL


 


Albumin/Globulin Ratio     (1-3)  


 


Lipase     (11.0-82.0)  U/L


 


Urine Color    Yellow  


 


Urine Appearance    Clear  


 


Urine pH    7.0  (5-9)  


 


Ur Specific Gravity    1.012  (1.010-1.030)  


 


Urine Protein    1+(30 mg/dl) H  (Negative)  


 


Urine Ketones    Trace H  (Negative)  


 


Urine Blood    Negative  (Negative)  


 


Urine Nitrate    Negative  (Negative)  


 


Urine Bilirubin    Negative  (Negative)  


 


Urine Urobilinogen    Negative  (Negative)  


 


Ur Leukocyte Esterase    Negative  (Negative)  


 


Urine WBC (Auto)    Trace(0-5/hpf)  (Absent)  


 


Urine RBC (Auto)    Trace(0-2/hpf)  (Absent)  


 


Urine Bacteria    Absent  (Absent)  


 


Urine Glucose    Negative  (Negative)  














  01/17/18 01/17/18 Range/Units





  10:00 11:55 


 


WBC    (3.5-10.8)  10^3/ul


 


RBC    (4.0-5.4)  10^6/ul


 


Hgb    (14.0-18.0)  g/dl


 


Hct    (42-52)  %


 


MCV    (80-94)  fL


 


MCH    (27-31)  pg


 


MCHC    (31-36)  g/dl


 


RDW    (10.5-15)  %


 


Plt Count    (150-450)  10^3/ul


 


MPV    (7.4-10.4)  um3


 


Neut % (Auto)    (38-83)  %


 


Lymph % (Auto)    (25-47)  %


 


Mono % (Auto)    (1-9)  %


 


Eos % (Auto)    (0-6)  %


 


Baso % (Auto)    (0-2)  %


 


Absolute Neuts (auto)    (1.5-7.7)  10^3/ul


 


Absolute Lymphs (auto)    (1.0-4.8)  10^3/ul


 


Absolute Monos (auto)    (0-0.8)  10^3/ul


 


Absolute Eos (auto)    (0-0.6)  10^3/ul


 


Absolute Basos (auto)    (0-0.2)  10^3/ul


 


Absolute Nucleated RBC    10^3/ul


 


Nucleated RBC %    


 


INR (Anticoag Therapy)    (0.77-1.02)  


 


APTT    (26.0-36.3)  seconds


 


D-Dimer, Quantitative    (Less Than 230)  ng/mL


 


Sodium    (133-145)  mmol/L


 


Potassium  3.3 L   


 


Chloride    (101-111)  mmol/L


 


Carbon Dioxide    (22-32)  mmol/L


 


Anion Gap    (2-11)  mmol/L


 


BUN    (6-24)  mg/dL


 


Creatinine    (0.67-1.17)  mg/dL


 


Est GFR ( Amer)    (>60)  


 


Est GFR (Non-Af Amer)    (>60)  


 


BUN/Creatinine Ratio    (8-20)  


 


Glucose    ()  mg/dL


 


Lactic Acid   2.0  (0.5-2.0)  mmol/L


 


Calcium    (8.6-10.3)  mg/dL


 


Total Bilirubin    (0.2-1.0)  mg/dL


 


AST  45 H   


 


ALT    (7-52)  U/L


 


Alkaline Phosphatase    ()  U/L


 


Troponin I    (<0.04)  ng/mL


 


C-Reactive Protein    (< 5.00)  mg/L


 


B-Natriuretic Peptide   ( - 100) pg/mL


 


Total Protein    (6.4-8.9)  g/dL


 


Albumin    (3.2-5.2)  g/dL


 


Globulin    (2-4)  g/dL


 


Albumin/Globulin Ratio    (1-3)  


 


Lipase    (11.0-82.0)  U/L


 


Urine Color    


 


Urine Appearance    


 


Urine pH    (5-9)  


 


Ur Specific Gravity    (1.010-1.030)  


 


Urine Protein    (Negative)  


 


Urine Ketones    (Negative)  


 


Urine Blood    (Negative)  


 


Urine Nitrate    (Negative)  


 


Urine Bilirubin    (Negative)  


 


Urine Urobilinogen    (Negative)  


 


Ur Leukocyte Esterase    (Negative)  


 


Urine WBC (Auto)    (Absent)  


 


Urine RBC (Auto)    (Absent)  


 


Urine Bacteria    (Absent)  


 


Urine Glucose    (Negative)  











Microbiology and Other Data: 


 Microbiology











 01/17/18 14:37 Influenza Types A,B Antigen (BENITA) - Final





 Nasal    Specimen received for Influenza A/B Molecular testing














Assess/Plan/Problems-Billing


Assessment: 





53 yo M p/w fatigue and cough s/p influenza B found with b/l lower lobe 

consolidations





- Patient Problems


(1) Acute respiratory failure with hypoxia


Comment: Multifactoral 


Influenza and asthma and PNA


CTX and azithro


Steroids


Tamiflu course completed


dulera, albuterol    





(2) Diabetes


Comment: lispro SS   





(3) Thrush


Comment: nystatin   





(4) Obstructive sleep apnea


Comment: CPAP   





(5) DVT prophylaxis


Comment: HSQ

## 2018-01-20 VITALS — SYSTOLIC BLOOD PRESSURE: 126 MMHG | DIASTOLIC BLOOD PRESSURE: 83 MMHG

## 2018-01-20 LAB
BASOPHILS # BLD AUTO: 0.1 10^3/UL (ref 0–0.2)
EOSINOPHIL # BLD AUTO: 0 10^3/UL (ref 0–0.6)
HCT VFR BLD AUTO: 35 % (ref 42–52)
HGB BLD-MCNC: 12.1 G/DL (ref 14–18)
LYMPHOCYTES # BLD AUTO: 1.3 10^3/UL (ref 1–4.8)
MCH RBC QN AUTO: 30 PG (ref 27–31)
MCHC RBC AUTO-ENTMCNC: 34 G/DL (ref 31–36)
MCV RBC AUTO: 86 FL (ref 80–94)
MONOCYTES # BLD AUTO: 0.7 10^3/UL (ref 0–0.8)
NEUTROPHILS # BLD AUTO: 7.7 10^3/UL (ref 1.5–7.7)
NRBC # BLD AUTO: 0 10^3/UL
NRBC BLD QL AUTO: 0
PLATELET # BLD AUTO: 314 10^3/UL (ref 150–450)
RBC # BLD AUTO: 4.08 10^6/UL (ref 4–5.4)
WBC # BLD AUTO: 9.8 10^3/UL (ref 3.5–10.8)

## 2018-01-20 RX ADMIN — NYSTATIN SCH UNITS: 100000 SUSPENSION ORAL at 08:28

## 2018-01-20 RX ADMIN — CEFTRIAXONE SODIUM SCH MLS/HR: 1 INJECTION, POWDER, FOR SOLUTION INTRAVENOUS at 12:34

## 2018-01-20 RX ADMIN — AMLODIPINE BESYLATE SCH MG: 5 TABLET ORAL at 08:29

## 2018-01-20 RX ADMIN — IPRATROPIUM BROMIDE AND ALBUTEROL SULFATE SCH NEB: .5; 3 SOLUTION RESPIRATORY (INHALATION) at 12:21

## 2018-01-20 RX ADMIN — MOMETASONE FUROATE AND FORMOTEROL FUMARATE DIHYDRATE SCH PUFF: 200; 5 AEROSOL RESPIRATORY (INHALATION) at 07:25

## 2018-01-20 RX ADMIN — HEPARIN SODIUM SCH UNITS: 5000 INJECTION INTRAVENOUS; SUBCUTANEOUS at 05:10

## 2018-01-20 RX ADMIN — INSULIN LISPRO SCH UNIT: 100 INJECTION, SOLUTION INTRAVENOUS; SUBCUTANEOUS at 12:39

## 2018-01-20 RX ADMIN — IPRATROPIUM BROMIDE AND ALBUTEROL SULFATE SCH NEB: .5; 3 SOLUTION RESPIRATORY (INHALATION) at 07:24

## 2018-01-20 RX ADMIN — IPRATROPIUM BROMIDE AND ALBUTEROL SULFATE SCH: .5; 3 SOLUTION RESPIRATORY (INHALATION) at 01:46

## 2018-01-20 RX ADMIN — OMEPRAZOLE SCH MG: 20 CAPSULE, DELAYED RELEASE ORAL at 08:29

## 2018-01-20 RX ADMIN — HEPARIN SODIUM SCH UNITS: 5000 INJECTION INTRAVENOUS; SUBCUTANEOUS at 14:09

## 2018-01-20 RX ADMIN — NYSTATIN SCH UNITS: 100000 SUSPENSION ORAL at 14:10

## 2018-01-20 RX ADMIN — TAPENTADOL HYDROCHLORIDE SCH MG: 50 TABLET, FILM COATED ORAL at 08:29

## 2018-01-20 RX ADMIN — ATORVASTATIN CALCIUM SCH MG: 10 TABLET, FILM COATED ORAL at 08:29

## 2018-01-20 RX ADMIN — AZITHROMYCIN SCH MLS/HR: 500 INJECTION, POWDER, LYOPHILIZED, FOR SOLUTION INTRAVENOUS at 13:00

## 2018-01-20 RX ADMIN — TAPENTADOL HYDROCHLORIDE SCH MG: 50 TABLET, FILM COATED ORAL at 14:09

## 2018-01-20 RX ADMIN — INSULIN LISPRO SCH UNIT: 100 INJECTION, SOLUTION INTRAVENOUS; SUBCUTANEOUS at 08:28

## 2018-01-20 RX ADMIN — MONTELUKAST SODIUM SCH MG: 10 TABLET, COATED ORAL at 08:29

## 2018-01-21 NOTE — DS
CC:  Dr. Bach *

 

DISCHARGE SUMMARY:

 

DATE OF ADMISSION:  01/17/18

 

DATE OF DISCHARGE:  01/20/18

 

PRIMARY CARE PROVIDER:  Dr. Bach.

 

PRINCIPAL DIAGNOSES:

1.  Post influenza pneumonia.

2.  Mild asthma exacerbation.

3.  Acute hypoxic respiratory failure - resolved.

 

SECONDARY DIAGNOSES:

1.  Type 2 diabetes.

2.  Chronic pain.

3.  Hypertension.

4.  Hyperlipidemia.

 

DISCHARGE MEDICATIONS:

1.  Allegra 60 mg p.o. daily p.r.n. allergies.

2.  Nexium 40 mg p.o. daily.

3.  Nucynta  mg p.o. b.i.d.

4.  Symbicort 160/4.5 two puffs inhaled twice daily.

5.  Tylenol/Codeine 30 mg 1 tab p.o. q.6 hours p.r.n. pain.

6.  Hydrochlorothiazide 25 mg p.o. daily.

7.  Flonase 2 squirts to both nostrils daily.

8.  Flovent 1 puff inhaled twice daily.

9.  Lexapro 20 mg p.o. daily.

10.  Lipitor 10 mg p.o. daily.

11.  Albuterol 2 puffs inhaled q.4 hours p.r.n. shortness of breath.

12.  Metformin 500 mg p.o. t.i.d.

13.  Amlodipine 5 mg p.o. daily.

14.  Singulair 10 mg p.o. daily.

15.  Levaquin 500 mg p.o. daily x4 days.

 

HOSPITAL COURSE:  Mr. Galvez is a 52-year-old male, who was diagnosed with 
influenza approximately 4 days prior to admission.  The patient despite being 
started on Tamiflu continued to feel more short of breath and having an 
increased cough.  He therefore presented to the emergency room, where he was 
diagnosed with possible post influenza bibasilar pneumonia and mild asthma 
exacerbation.  The patient was treated with ceftriaxone and azithromycin after 
a CAT scan of the chest showed bibasilar infiltrates.  The patient also had 
fevers on initial presentation, which has subsequently resolved.  Overall, the 
patient is feeling improved.  He is no longer requiring any supplemental oxygen 
as he did have acute hypoxic respiratory failure on presentation to the 
hospital.  The patient at this point feels ready for discharge home.  The 
patient will complete 4 more days of Levaquin therapy.  The patient has been on 
several days of prednisone and this will not be continued at this point.  The 
patient will continue on his usual inhaler regimen for his history of asthma.

 

The patient will be maintained on his usual home medication regimen.  His 
metformin was held after he underwent a CTA of the chest to rule out PE on 01/18
/18.  The patient will resume his metformin this evening.  No changes had been 
made to his home medication regimen.

 

On the day of discharge, the patient's vital signs are stable.  He is not 
requiring any supplemental oxygen.  He seems sitting up, awake, alert, and 
oriented in a chair, in no acute distress.  His cardiac exam reveals a normal S1
, S2 with a regular rate and rhythm.  His lungs are clear.  His abdomen reveals 
normal bowel sounds.  It is soft and nontender.

 

The patient's lab work on the day of discharge reveals his white blood cell 
count to have normalized.  His electrolytes are within acceptable range as are 
his blood sugars.

 

FOLLOWUP CONCERNS:  The patient is being discharged home today, 01/20/18.

 

ACTIVITY LEVEL:  As tolerated.

 

DIET:  Diabetic.

 

CONDITION ON DISCHARGE:  Stable.

 

FOLLOWUP:  The patient is to follow up with Dr. Bach on 01/24/18 at 8:20 a.m.

 

TIME SPENT:  Thiry-five minutes was spent discharging this patient.

 

 [id number]/CPS #: [id number]

GUSTAVO

## 2018-04-11 ENCOUNTER — HOSPITAL ENCOUNTER (EMERGENCY)
Dept: HOSPITAL 25 - UCEAST | Age: 53
Discharge: HOME | End: 2018-04-11
Payer: COMMERCIAL

## 2018-04-11 VITALS — SYSTOLIC BLOOD PRESSURE: 130 MMHG | DIASTOLIC BLOOD PRESSURE: 94 MMHG

## 2018-04-11 DIAGNOSIS — J45.901: Primary | ICD-10-CM

## 2018-04-11 DIAGNOSIS — Z79.84: ICD-10-CM

## 2018-04-11 DIAGNOSIS — K21.9: ICD-10-CM

## 2018-04-11 DIAGNOSIS — I10: ICD-10-CM

## 2018-04-11 DIAGNOSIS — E11.9: ICD-10-CM

## 2018-04-11 DIAGNOSIS — E78.5: ICD-10-CM

## 2018-04-11 PROCEDURE — G0463 HOSPITAL OUTPT CLINIC VISIT: HCPCS

## 2018-04-11 PROCEDURE — 99212 OFFICE O/P EST SF 10 MIN: CPT

## 2018-04-11 NOTE — UC
Respiratory Complaint HPI





- HPI Summary


HPI Summary: 





53 y/o WM presents with dry cough and sinus congestion for the last week. He 

tells me that he has a significant history of moderate to severe asthma. He was 

hospitalized in January of this year for Influenza and PNA. He felt better 

after being hospitalized, but a cough has started again. Has been using his 

inhalers and nebulizers more often recently. Denies fever, chills, sore throat, 

SOB, chest pain, abdominal pain.








- History of Current Complaint


Chief Complaint: UCRespiratory


Stated Complaint: ASTHMA, AND CHEST CONGESTION


Time Seen by Provider: 04/11/18 16:15


Hx Obtained From: Patient


Onset/Duration: Gradual Onset


Timing: Constant


Severity Currently: None


Pain Intensity: 0


Character: Cough: Nonproductive





- Allergies/Home Medications


Allergies/Adverse Reactions: 


 Allergies











Allergy/AdvReac Type Severity Reaction Status Date / Time


 


No Known Allergies Allergy   Verified 04/11/18 16:03











Home Medications: 


 Home Medications





Mometasone/Formoter 100/5 MDI* [Dulera 100/5 MDI*] 2 puff INH BID 04/11/18 [

History Confirmed 04/11/18]


Tiotropium CAP.INH* [Spiriva CAP.INH*] 1 cap.inh INH DAILY 04/11/18 [History 

Confirmed 04/11/18]











PMH/Surg Hx/FS Hx/Imm Hx


Endocrine History: Diabetes, Dyslipidemia


Cardiovascular History: Hypertension


Respiratory History: Asthma


GI/ History: Gastroesophageal Reflux





- Surgical History


Surgical History: Yes


Surgery Procedure, Year, and Place: right foot - crushed calcaneous





- Family History


Known Family History: Positive: Hypertension


   Negative: Other - FHx of COPD or liver disease





- Social History


Lives: With Family


Alcohol Use: Rare


Substance Use Type: None


Smoking Status (MU): Never Smoked Tobacco





- Immunization History


Most Recent Influenza Vaccination: fall 2017


Most Recent Tetanus Shot: within the last 10 years


Most Recent Pneumonia Vaccination: received in past





Review of Systems


Constitutional: Negative


Skin: Negative


Eyes: Negative


ENT: Sinus Congestion


Respiratory: Cough


Cardiovascular: Negative


Gastrointestinal: Negative


Neurovascular: Negative


Musculoskeletal: Negative


Neurological: Negative


Psychological: Negative


All Other Systems Reviewed And Are Negative: Yes





Physical Exam





- Summary


Physical Exam Summary: 





 


GENERAL: NAD. WDWN. No pain distress.


SKIN: No rashes, sores, ulcers, masses, lesions.


HEENT:


            Head: AT/NC


            Eyes:  EOM intact. Conjunctiva clear without inflammation or 

discharge.


            Ears: Hearing grossly normal. TMs intact, no bulging, erythema, or 

edema. 


            Nose: Nasal mucosa pink and moist. NTTP maxillary and frontal 

sinus. 


            Throat: Posterior oropharynx without exudates, erythema, or 

tonsillar enlargement.  Uvula midline.


NECK: Supple. Nontender. No lymphadenopathy. 


CHEST:  CTAB. No r/r/w. No accessory muscle use. Breathing comfortably and in 

no distress.


CV:  RRR. Without m/r/g. Pulses intact. Brisk cap refill.


NEURO: Alert. CN II-XII grossly intact.


PSYCH: Age appropriate behavior.


Triage Information Reviewed: Yes


Vital Signs: 


 Initial Vital Signs











Temp  97.0 F   04/11/18 16:00


 


Pulse  91   04/11/18 16:00


 


Resp  18   04/11/18 16:00


 


BP  130/94   04/11/18 16:00


 


Pulse Ox  95   04/11/18 16:00














[facility] Diagnostic Evaluation





- Laboratory


O2 Sat by Pulse Oximetry: 95





Respiratory Course/Dx





- Course


Course Of Treatment: Given his comorbidities and recent hospitalization - will 

treat cover him with azithromycin and rx for prednisone.





- Differential Dx/Diagnosis


Provider Diagnoses: Asthma exacerbation





Discharge





- Sign-Out/Discharge


Documenting (check all that apply): Discharge





- Discharge Plan


Condition: Stable


Disposition: HOME


Prescriptions: 


Azithromycin TAB* [Zithromax TAB (Z-KIAN) 250 mg #6 tabs] 2 tab PO .TODAY, THEN 

1 DAILY #1 kian


predniSONE TAB* [Deltasone TAB*] 50 mg PO DAILY #5 tab


Patient Education Materials:  Asthma (ED)


Referrals: 


Haile Bach MD [Primary Care Provider] - 


Additional Instructions: 


If you develop a fever, shortness of breath, chest pain, new or worsening 

symptoms - please call your PCP or go to the ED.


 








- Billing Disposition and Condition


Condition: STABLE


Disposition: HOME

## 2018-11-14 ENCOUNTER — HOSPITAL ENCOUNTER (EMERGENCY)
Dept: HOSPITAL 25 - UCEAST | Age: 53
Discharge: HOME | End: 2018-11-14
Payer: COMMERCIAL

## 2018-11-14 VITALS — DIASTOLIC BLOOD PRESSURE: 110 MMHG | SYSTOLIC BLOOD PRESSURE: 153 MMHG
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
## 2018-11-14 DIAGNOSIS — I10: ICD-10-CM
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
## 2018-11-14 DIAGNOSIS — S39.012A: Primary | ICD-10-CM

## 2018-11-14 DIAGNOSIS — J45.909: ICD-10-CM

## 2018-11-14 DIAGNOSIS — Z87.891: ICD-10-CM

## 2018-11-14 DIAGNOSIS — R73.03: ICD-10-CM

## 2018-11-14 PROCEDURE — 99212 OFFICE O/P EST SF 10 MIN: CPT

## 2018-11-14 PROCEDURE — G0463 HOSPITAL OUTPT CLINIC VISIT: HCPCS

## 2018-11-14 NOTE — XMS REPORT
Continuity of Care Document (CCD)

 Created on:2018



Patient:Tay Galvez

Sex:Male

:1965

External Reference #:[id number]





Demographics







 Address  [address] [place] [address]

 

 Mobile Phone  [phone]

 

 Email Address  [email or web address]

 

 Preferred Language  en

 

 Marital Status  Declined to Specify/Unknown

 

 Jewish Affiliation  Unknown

 

 Race  Unknown

 

 Ethnic Group  Declined to Specify/Unknown









Author







 Name  Elyssa Stevens









Care Team Providers







 Name  Role  Phone

 

 Haile Bach M.D.  Care Team Information   Unavailable

 

 Haile Bach M.D.  Primary Care Physician  Unavailable









Payers







 Type  Date  Identification Numbers  Payment Provider  Subscriber

 

   Effective:  Policy Number: [id number]  BC/ Of CALLIE  Tay Galvez



   10/01/2014      









 PayID: [id number]  [address] [id number]









 [place] [address]







Advance Directives







 Description

 

 No Information Available







Problems







 Date  Description  Provider  Status

 

 Onset: 10/02/2015  Allergic rhinitis due to pollen  Allergy Injection  Active

 

 Onset: 10/02/2015  Allergic rhinitis  Allergy Injection  Active

 

 Onset: 10/02/2015  Allergic rhinitis due to animals  Allergy Injection  Active

 

 Onset: 10/02/2015  Allergic rhinitis  Allergy Injection  Active

 

 Onset: 2017  Urticaria  Ele Reyes M.D.  Active

 

 Onset: 2017  Uncomplicated moderate persistent  Ele Reyes M.D.  
Active



   asthma    

 

 Onset: 2016  Uncomplicated moderate persistent  Alexis Cortes M.D.
  Active



   asthma    

 

 Onset: 2016  Immunization  Alexis Cortes M.D.  Active

 

 Onset: 2016  Body mass index 30+ - obesity  Alexis Cortes M.D.  
Active







Family History







 Date  Family Member(s)  Problem(s)  Comments

 

   General  Diabetes  sister

 

   General  Food Allergy  mother (strawberries), brother (peanuts, eggs)







Social History







 Type  Date  Description  Comments

 

 Birth Sex    Unknown  

 

 Marital Status    Legal Status: Domestic  



     Partner  

 

 Lives With    Female Partner  

 

 Home Environment    Does not use air   

 

 Home Environment    Does not have an air  



     conditioner  

 

 Home Environment    Stairs are present  

 

 Home Environment    Unfinished Basement  

 

 Home Environment    The basement is damp and  



     dehumidifier used  

 

 Home Environment    [employer]  

 

 Home Environment    Mattress is 5 years old  

 

 Home Environment    Regular Mattress  

 

 Home Environment    Mattress is not encased in an  



     allergy proof case  

 

 Home Environment    Pillows are not encased in an  



     allergy proof case  

 

 Home Environment    Pillows are polyester  

 

 Home Environment    Uses a dehumidifier  

 

 Home Environment    There are draperies in the  



     home  

 

 Home Environment    The home is [place]  

 

 Home Environment    The floors are wood  

 

 Home Environment    The floors are tile  

 

 Home Environment    The floors are carpeted  

 

 Home Environment    Uses hot water heating  

 

 Home Environment    [place]  

 

 Home Environment    Water Source: Well  

 

 Smoke-Free    Home is smoke-free  

 

 Pets    3 cats  

 

 Pets    Horse  

 

 Pets    1 dog  

 

 Pets    Animals sleep in bedroom  

 

 ETOH Use    Occasionally consumes alcohol  

 

 Tobacco Use  Start: Unknown End:  Patient is a former smoker  



   Unknown    

 

 Recreational Drug Use    Denies Drug Use  







Allergies, Adverse Reactions, Alerts







 Description

 

 No Known Drug Allergies







Medications







 Medication  Date  Status  Form  Strength  Qnty  SIG  Indications  Ordering



                 Provider

 

 Dulera    Active  Aerosol  200-5mcg/  13gm  inhale 2  J30.89  Ele M



         Act    puffs    Amy,



             twice    M.D.



             daily    

 

 Proair HFA    Active  Aerosol  108(90Bas  1inha  inhale two    Corrie



   /      e)  ler  puffs by    Uldrich,



         mcg/Act    mouth    FNP-C



             every 4    



             hours as    



             needed for    



             cough,    



             wheezing    



             or    



             difficulty    



             breathing    

 

 Montelukast  10/24  Active  Tablets  10mg  30tab  take one  J45.40  Sybil



 Sodium          s  tablet by    Fregoso-Jam



             mouth    georgia,



             every day    FNP-C

 

 Fluticasone  10/03  Active  Suspension  50mcg/Act  16uni  Inhale One  J30.89  
Scot



 Propionate          ts  To Two    Rubinstei



             Sprays In    n, M.D.



             Each    



             Nostril    



             Every Day    



             pt needs    



             to make    



             f/u    



             appointmen    



             t for    



             further rx    

 

 Lexapro    Active  Tablets  10mg    1 po qd    Unknown



                 

 

 Norvasc    Active  Tablets  5mg    1 By Mouth    Unknown



   /0000          Once Daily    

 

 Amlodipine    Active  Tablets  5mg    1 po qd    Unknown



 Bes              

 

 Hydrochlorothiazi    Active  Tablets  25mg    1 tablet    Unknown



           every day    

 

 Metformin HCL    Active  Tablets  500mg    Take three    Unknown



             times    



             daily    

 

 Fexofenadine HCL    Active  Tablets  180mg    1 by mouth    Unknown



   /0000          every day    

 

 Nucynta ER    Active  Tablets ER  150mg        Unknown



   /0000    12HR          

 

 Albuterol Sulfate    Active  Nebulizer  1.25mg/3M        Darlow,



   /      L        Haile WALTON M.D.

 

 Nexium 24HR    Active  Capsules DR  40mg    BiD    Unknown



   /0000              

 

 Spiriva Respimat    Active  Aerosol  1.25mcg/A    2 puffs    Ele       ct    once daily    EDEL Reyes

 

 Sucralfate    Active  Tablets  1gm        Niki,



   /              EDEL Montoya







Medications Administered in Office







 Medication  Date  Status  Form  Strength  Qnty  SIG  Indications  Ordering



                 Provider

 

 Injection  10/12/20  Administered  Injection          Allergy



   18              Injection

 

 Injection  20  Administered  Injection          Allergy



   18              Injection

 

 Injection  20  Administered  Injection          Allergy



   18              Injection

 

 Injection  20  Administered  Injection          Allergy



   18              Injection

 

 Injection  20  Administered  Injection          Allergy



   18              Injection

 

 Injection  20  Administered  Injection          Allergy



   18              Injection

 

 Injection  20  Administered  Injection          Ele MENSAH



   18              EDEL Reyes

 

 Injection  20  Administered  Injection          Allergy



   18              Injection

 

 Injection  20  Administered  Injection          Allergy



   18              Injection

 

 Injection  06/15/20  Administered  Injection          Allergy



   18              Injection

 

 Injection  20  Administered  Injection          Allergy



   18              Injection

 

 Injection  20  Administered  Injection          Allergy



   18              Injection

 

 Injection  20  Administered  Injection          Allergy



   18              Injection

 

 Injection  20  Administered  Injection          Allergy



   18              Injection

 

 Injection  20  Administered  Injection          Allergy



   18              Injection

 

 Injection  20  Administered  Injection          Allergy



   18              Injection

 

 Injection  20  Administered  Injection          Allergy



   18              Injection

 

 Injection  20  Administered  Injection          Allergy



   18              Injection

 

 Injection  20  Administered  Injection          Allergy



   17              Injection

 

 Injection  12/15/20  Administered  Injection          Ele MENSAH



   17              EDEL Reyes

 

 Injection  12/15/20  Administered  Injection          Allergy



   17              Injection

 

 Injection  20  Administered  Injection          Allergy



   17              Injection

 

 Injection  20  Administered  Injection          Allergy



   17              Injection

 

 Injection  10/20/20  Administered  Injection          Allergy



   17              Injection

 

 Injection  10/06/20  Administered  Injection          Allergy



   17              Injection

 

 Injection  20  Administered  Injection          Allergy



   17              Injection

 

 Injection  20  Administered  Injection          Allergy



   17              Injection

 

 Injection  20  Administered  Injection          Allergy



   17              Injection

 

 Injection  20  Administered  Injection          Allergy



   17              Injection

 

 Injection  20  Administered  Injection          Allergy



   17              Injection

 

 Injection  20  Administered  Injection          Allergy



   17              Injection

 

 Injection  20  Administered  Injection          Alexis Cortes M.D.

 

 Injection  20  Administered  Injection          Allergy



   17              Injection

 

 Injection  20  Administered  Injection          Allergy



   17              Injection

 

 Injection  20  Administered  Injection          Alexis Cortes M.D.

 

 Injection  20  Administered  Injection          Allergy



   17              Injection

 

 Injection  20  Administered  Injection          Alexis Cortes M.D.

 

 Injection  20  Administered  Injection          Allergy



   17              Injection

 

 Injection  20  Administered  Injection          Allergy



   17              Injection

 

 Injection  20  Administered  Injection          Allergy



   17              Injection

 

 Injection  20  Administered  Injection          Allergy



   17              Injection

 

 Injection  20  Administered  Injection          Allergy



   17              Injection

 

 Injection  03/10/20  Administered  Injection          Allergy



   17              Injection

 

 Injection  20  Administered  Injection          Allergy



   17              Injection

 

 Injection  20  Administered  Injection          Allergy



   17              Injection

 

 Injection  20  Administered  Injection          Allergy



   17              Injection

 

 Injection  20  Administered  Injection          Allergy



   17              Injection

 

 Injection  10/28/20  Administered  Injection          Allergy



   16              Injection

 

 Injection  10/14/20  Administered  Injection          Allergy



   16              Injection

 

 Injection  20  Administered  Injection          Allergy



   16              Injection

 

 Injection  20  Administered  Injection          Allergy



   16              Injection

 

 Injection  20  Administered  Injection          Allergy



   16              Injection

 

 Injection  20  Administered  Injection          Allergy



   16              Injection

 

 Injection  20  Administered  Injection          Allergy



   16              Injection

 

 Injection  20  Administered  Injection          Allergy



   16              Injection

 

 Injection  20  Administered  Injection          Allergy



   16              Injection

 

 Injection  06/10/20  Administered  Injection          Allergy



   16              Injection

 

 Injection  20  Administered  Injection          Allergy



   16              Injection

 

 Injection  20  Administered  Injection          Allergy



   16              Injection

 

 Injection  20  Administered  Injection          Allergy



   16              Injection

 

 Injection  20  Administered  Injection          Allergy



   16              Injection

 

 Injection  20  Administered  Injection          Allergy



   16              Injection

 

 Injection  20  Administered  Injection          Allergy



   16              Injection

 

 Injection  20  Administered  Injection          Allergy



   16              Injection

 

 Injection  20  Administered  Injection          Allergy



   16              Injection

 

 Injection  20  Administered  Injection          Allergy



   16              Injection

 

 Injection  20  Administered  Injection          Allergy



   16              Injection

 

 Injection  20  Administered  Injection          Allergy



   16              Injection

 

 Injection  20  Administered  Injection          Allergy



   15              Injection

 

 Injection  20  Administered  Injection          Allergy



   15              Injection

 

 Injection  10/30/20  Administered  Injection          Allergy



   15              Injection

 

 Injection  10/16/20  Administered  Injection          Allergy



   15              Injection

 

 Injection  10/02/20  Administered  Injection          Allergy



   15              Injection

 

 Injection  20  Administered  Injection          Allergy



   15              Injection

 

 Injection  09/04/20  Administered  Injection          Allergy



   15              Injection

 

 Injection  08//20  Administered  Injection          Allergy



   15              Injection

 

 Injection  08/07/20  Administered  Injection          Allergy



   15              Injection

 

 Injection  24/20  Administered  Injection          Allergy



   15              Injection

 

 Injection  07/10/20  Administered  Injection          Allergy



   15              Injection

 

 Injection  /20  Administered  Injection          Allergy



   15              Injection

 

 Injection  06//20  Administered  Injection          Allergy



   15              Injection

 

 Injection  /20  Administered  Injection          Allergy



   15              Injection

 

 Injection  05/15/20  Administered  Injection          Allergy



   15              Injection

 

 Injection  05//20  Administered  Injection          Allergy



   15              Injection

 

 Injection  04/20  Administered  Injection          Allergy



   15              Injection

 

 Injection  04/20  Administered  Injection          Allergy



   15              Injection

 

 Injection  /20  Administered  Injection          Allergy



   15              Injection

 

 Injection  /20  Administered  Injection          Allergy



   15              Injection

 

 Injection  /20  Administered  Injection          Allergy



   15              Injection

 

 Injection  20  Administered  Injection          Allergy



   15              Injection

 

 Injection  20  Administered  Injection          Allergy



   15              Injection

 

 Injection  20  Administered  Injection          Allergy



   15              Injection

 

 Injection  /20  Administered  Injection          Allergy



   14              Injection

 

 Injection  /20  Administered  Injection          Alexis WXena



   14              EDEL Cortes

 

 Injection  20  Administered  Injection          Allergy



   14              Injection

 

 Injection  1220  Administered  Injection          Allergy



   14              Injection

 

 Injection  /20  Administered  Injection          Allergy



   14              Injection

 

 Injection  20  Administered  Injection          Allergy



   14              Injection

 

 Injection  10/24/20  Administered  Injection          Allergy



   14              Injection

 

 Injection  10/10/20  Administered  Injection          Allergy



   14              Injection

 

 Injection  /20  Administered  Injection          Alexis WXena



   14              EDEL Cortes

 

 Injection  /20  Administered  Injection          Allergy



   14              Injection

 

 Injection  09//20  Administered  Injection          Allergy



   14              Injection

 

 Injection  0825/20  Administered  Injection          Allergy



   14              Injection

 

 Injection  0815/20  Administered  Injection          Allergy



   14              Injection

 

 Injection  08/08/20  Administered  Injection          Allergy



   14              Injection

 

 Injection  07/18/20  Administered  Injection          Allergy



   14              Injection

 

 Injection  07//20  Administered  Injection          Allergy



   14              Injection

 

 Injection  07/02/20  Administered  Injection          Allergy



   14              Injection

 

 Injection  06/20/20  Administered  Injection          Allergy



   14              Injection

 

 Injection  06//20  Administered  Injection          Allergy



   14              Injection

 

 Injection  06/06/20  Administered  Injection          Allergy



   14              Injection

 

 Injection  05/30/20  Administered  Injection          Allergy



   14              Injection

 

 Injection  05/16/20  Administered  Injection          Allergy



   14              Injection

 

 Injection  05//20  Administered  Injection          Allergy



   14              Injection

 

 Injection  05/02/20  Administered  Injection          Allergy



   14              Injection

 

 Injection  0425/20  Administered  Injection          Allergy



   14              Injection

 

 Injection  04/20  Administered  Injection          Allergy



   14              Injection

 

 Injection  04//20  Administered  Injection          Allergy



   14              Injection

 

 Injection  20  Administered  Injection          Allergy



   14              Injection

 

 Injection  20  Administered  Injection          Allergy



   14              Injection

 

 Injection  20  Administered  Injection          Allergy



   14              Injection

 

 Injection  20  Administered  Injection          Allergy



   14              Injection

 

 Injection  20  Administered  Injection          Allergy



   14              Injection

 

 Injection  20  Administered  Injection          Allergy



   14              Injection

 

 Injection  20  Administered  Injection          Allergy



   14              Injection

 

 Injection  20  Administered  Injection          Allergy



   14              Injection

 

 Injection  20  Administered  Injection          Allergy



   14              Injection

 

 Injection  20  Administered  Injection          Allergy



   14              Injection

 

 Injection  20  Administered  Injection          Allergy



   14              Injection







Immunizations







 Description

 

 No Information Available







Vital Signs







 Date  Vital  Result  Comment

 

 10/17/2018 11:32am  Height  70.5 inches  5'10.50"









 Weight  257.00 lb  

 

 Weight  116.575 kg  

 

 Respiratory Rate  20 /min  

 

 Heart Rate  97 /min  

 

 O2 % BldC Oximetry  98 %  

 

 BP Systolic  128 mmHg  

 

 BP Diastolic  80 mmHg  

 

 Asthma Control Test  13  

 

 BMI (Body Mass Index)  36.4 kg/m2  









 08/10/2018 12:04pm  Height  70.5 inches  5'10.50"









 Weight  265.00 lb  

 

 Weight  120.204 kg  

 

 Respiratory Rate  20 /min  

 

 Heart Rate  87 /min  

 

 O2 % BldC Oximetry  96 %  

 

 BP Systolic  144 mmHg  

 

 BP Diastolic  85 mmHg  

 

 Asthma Control Test  15  

 

 BMI (Body Mass Index)  37.5 kg/m2  









 2018 11:30am  Height  70.5 inches  5'10.50"









 Weight  240.00 lb  

 

 Weight  108.864 kg  

 

 Respiratory Rate  22 /min  

 

 Heart Rate  81 /min  

 

 O2 % BldC Oximetry  97 %  

 

 BP Systolic  129 mmHg  

 

 BP Diastolic  86 mmHg  

 

 Asthma Control Test  21  

 

 BMI (Body Mass Index)  33.9 kg/m2  









 2018  3:09pm  Height  70.5 inches  5'10.50"









 Weight  240.00 lb  

 

 Weight  108.864 kg  

 

 Respiratory Rate  18 /min  

 

 Heart Rate  95 /min  

 

 O2 % BldC Oximetry  98 %  

 

 BP Systolic  126 mmHg  

 

 BP Diastolic  88 mmHg  

 

 Asthma Control Test  21  

 

 BMI (Body Mass Index)  33.9 kg/m2  









 2017  3:08pm  Height  70.5 inches  5'10.50"









 Weight  261.00 lb  

 

 Weight  118.390 kg  

 

 Respiratory Rate  16 /min  

 

 Heart Rate  81 /min  

 

 O2 % BldC Oximetry  97 %  

 

 BP Systolic  128 mmHg  

 

 BP Diastolic  76 mmHg  

 

 Asthma Control Test  22  

 

 BMI (Body Mass Index)  36.9 kg/m2  









 2017  4:11pm  Height  70.5 inches  5'10.50"









 Weight  265.00 lb  

 

 Weight  120.204 kg  

 

 Respiratory Rate  16 /min  

 

 Heart Rate  85 /min  

 

 O2 % BldC Oximetry  97 %  

 

 BP Systolic  126 mmHg  

 

 BP Diastolic  79 mmHg  

 

 Asthma Control Test  24  

 

 BMI (Body Mass Index)  37.5 kg/m2  









 2017  9:18am  Height  70.5 inches  5'10.50"









 Weight  267.00 lb  

 

 Weight  121.111 kg  

 

 Respiratory Rate  12 /min  

 

 Heart Rate  76 /min  

 

 O2 % BldC Oximetry  97 %  

 

 BP Systolic  121 mmHg  

 

 BP Diastolic  77 mmHg  

 

 Asthma Control Test  22  

 

 BMI (Body Mass Index)  37.8 kg/m2  









 2017 11:41am  Height  70.5 inches  5'10.50"









 Weight  262.00 lb  

 

 Weight  118.843 kg  

 

 Respiratory Rate  16 /min  

 

 Heart Rate  73 /min  

 

 O2 % BldC Oximetry  97 %  

 

 BP Systolic  136 mmHg  

 

 BP Diastolic  88 mmHg  

 

 BMI (Body Mass Index)  37.1 kg/m2  









 2016  2:59pm  Height  70 inches  5'10"









 Weight  258.00 lb  

 

 Weight  117.029 kg  

 

 Respiratory Rate  18 /min  

 

 Heart Rate  88 /min  

 

 O2 % BldC Oximetry  98 %  

 

 BP Systolic  134 mmHg  

 

 BP Diastolic  76 mmHg  

 

 BMI (Body Mass Index)  37.0 kg/m2  









 2016 11:31am  Height  70 inches  5'10"









 Weight  263.00 lb  

 

 Weight  119.297 kg  

 

 Respiratory Rate  16 /min  

 

 Heart Rate  74 /min  

 

 O2 % BldC Oximetry  98 %  

 

 BP Systolic  131 mmHg  

 

 BP Diastolic  78 mmHg  

 

 Asthma Control Test  20  

 

 BMI (Body Mass Index)  37.7 kg/m2  









 2016 11:05am  Height  70 inches  5'10"









 Weight  262.00 lb  

 

 Weight  118.843 kg  

 

 Heart Rate  78 /min  

 

 O2 % BldC Oximetry  97 %  

 

 BP Systolic  122 mmHg  

 

 BP Diastolic  80 mmHg  

 

 Asthma Control Test  24  

 

 BMI (Body Mass Index)  37.6 kg/m2  









 2014  3:08pm  Height  70 inches  5'10"









 Weight  258.00 lb  

 

 Weight  117.029 kg  

 

 Respiratory Rate  18 /min  

 

 Heart Rate  98 /min  

 

 O2 % BldC Oximetry  96 %  

 

 BP Systolic  122 mmHg  

 

 BP Diastolic  88 mmHg  

 

 Asthma Control Test  14  

 

 BMI (Body Mass Index)  37.0 kg/m2  









 2013 11:07am  Height  70 inches  5'10"









 Weight  246.00 lb  

 

 Weight  111.586 kg  

 

 Respiratory Rate  18 /min  

 

 Heart Rate  66 /min  

 

 O2 % BldC Oximetry  97 %  

 

 BP Systolic  136 mmHg  

 

 BP Diastolic  88 mmHg  

 

 Asthma Control Test  15  

 

 BMI (Body Mass Index)  35.3 kg/m2  









 10/24/2013 11:01am  Weight  240.00 lb  









 Weight  108.864 kg  

 

 Respiratory Rate  16 /min  

 

 Heart Rate  72 /min  

 

 O2 % BldC Oximetry  98 %  









 10/03/2013  8:58am  Height  71 inches  5'11"









 Weight  240.00 lb  

 

 Weight  108.864 kg  

 

 Respiratory Rate  14 /min  

 

 Heart Rate  67 /min  

 

 O2 % BldC Oximetry  98 %  

 

 BP Systolic  160 mmHg  

 

 BP Diastolic  110 mmHg  

 

 BMI (Body Mass Index)  33.5 kg/m2  







Results







 Description

 

 No Information Available







Procedures







 Date  Code  Description  Status

 

 10/17/2018  67415  Pre PFT  Completed

 

 10/12/2018  25438  Injection  Completed

 

 2018  83656  Injection  Completed

 

 2018  92337  Extract 1-10  Completed

 

 2018  86134  Injection  Completed

 

 2018  26877  Injection  Completed

 

 2018  52253  Injection  Completed

 

 08/10/2018  28450  Pre PFT  Completed

 

 2018  46672  Injection  Completed

 

 2018  80292  Injection  Completed

 

 2018  12949  Injection  Completed

 

 2018  55789  Injection  Completed

 

 06/15/2018  70148  Injection  Completed

 

 2018  83324  Injection  Completed

 

 2018  88941  Injection  Completed

 

 2018  82144  Injection  Completed

 

 2018  19974  Extract 1-10  Completed

 

 2018  43776  Injection  Completed

 

 2018  67338  Injection  Completed

 

 2018  86648  Pre PFT  Completed

 

 2018  35228  Injection  Completed

 

 2018  99979  Injection  Completed

 

 2018  01236  Injection  Completed

 

 2017  61270  Injection  Completed

 

 12/15/2017  91114  Injection  Completed

 

 12/15/2017  24531  Injection  Completed

 

 2017  42512  Injection  Completed

 

 2017  44392  Injection  Completed

 

 10/20/2017  43192  Injection  Completed

 

 10/06/2017  60509  Injection  Completed

 

 10/06/2017  32388  Extract 1-10  Completed

 

 2017  20338  Injection  Completed

 

 2017  85642  Injection  Completed

 

 2017  90010  Injection  Completed

 

 2017  71495  Pre PFT  Completed

 

 2017  92251  Injection  Completed

 

 2017  90823  Injection  Completed

 

 2017  50005  Injection  Completed

 

 2017  79372  Injection  Completed

 

 2017  08859  Injection  Completed

 

 2017  22407  Injection  Completed

 

 2017  39951  Injection  Completed

 

 2017  09136  Injection  Completed

 

 2017  42068  Extract 1-10  Completed

 

 2017  61523  Extract 1-10  Completed

 

 2017  11778  Injection  Completed

 

 2017  78304  Injection  Completed

 

 2017  14736  Injection  Completed

 

 2017  65772  Injection  Completed

 

 2017  60562  Pre PFT  Completed

 

 2017  61292  Injection  Completed

 

 2017  42455  Injection  Completed

 

 03/10/2017  19213  Injection  Completed

 

 2017  72555  Injection  Completed

 

 2017  16010  Injection  Completed

 

 2017  09603  Injection  Completed

 

 2017  33344  Injection  Completed

 

 2017  07104  Pre PFT  Completed

 

 10/28/2016  29223  Extract 1-10  Completed

 

 10/28/2016  20030  Injection  Completed

 

 10/14/2016  69522  Injection  Completed

 

 2016  78936  Injection  Completed

 

 2016  96058  Injection  Completed

 

 2016  83577  Pre PFT  Completed

 

 2016  64972  Injection  Completed

 

 2016  78816  Injection  Completed

 

 2016  37257  Injection  Completed

 

 2016  62021  Pre PFT  Completed

 

 2016  44694  Injection  Completed

 

 2016  42657  Injection  Completed

 

 06/10/2016  47257  Injection  Completed

 

 2016  72351  Extract 1-10  Completed

 

 2016  26100  Injection  Completed

 

 2016  07769  Injection  Completed

 

 2016  47366  Injection  Completed

 

 2016  24205  Pre PFT  Completed

 

 2016  34774  Injection  Completed

 

 2016  12529  Injection  Completed

 

 2016  66140  Injection  Completed

 

 2016  54082  Injection  Completed

 

 2016  36295  Injection  Completed

 

 2016  66979  Injection  Completed

 

 2016  50388  Injection  Completed

 

 2016  49093  Extract 1-10  Completed

 

 2016  82242  Injection  Completed

 

 2015  56843  Injection  Completed

 

 2015  36502  Injection  Completed

 

 10/30/2015  16234  Injection  Completed

 

 10/16/2015  70880  Injection  Completed

 

 10/02/2015  85613  Injection  Completed

 

 2015  38809  Injection  Completed

 

 2015  97877  Injection  Completed

 

 2015  11530  Injection  Completed

 

 2015  71028  Injection  Completed

 

 2015  98149  Extract 1-10  Completed

 

 2015  99581  Injection  Completed

 

 07/10/2015  08120  Injection  Completed

 

 2015  21358  Injection  Completed

 

 2015  18843  Injection  Completed

 

 2015  47778  Injection  Completed

 

 05/15/2015  49892  Injection  Completed

 

 2015  69594  Injection  Completed

 

 2015  65258  Injection  Completed

 

 2015  01984  Injection  Completed

 

 2015  01555  Injection  Completed

 

 2015  15540  Extract 1-10  Completed

 

 2015  95567  Injection  Completed

 

 2015  57373  Injection  Completed

 

 2015  07193  Injection  Completed

 

 2015  54962  Injection  Completed

 

 2015  31767  Injection  Completed

 

 2014  94110  Injection  Completed

 

 2014  72077  Injection  Completed

 

 2014  00609  Injection  Completed

 

 2014  23556  Injection  Completed

 

 2014  82984  Injection  Completed

 

 2014  06513  Injection  Completed

 

 10/24/2014  37248  Extract 1-10  Completed

 

 10/24/2014  78050  Injection  Completed

 

 10/10/2014  04048  Injection  Completed

 

 2014  46536  Injection  Completed

 

 2014  67469  Injection  Completed

 

 2014  73736  Injection  Completed

 

 2014  04827  Injection  Completed

 

 2014  85925  Pulmonary Function Test  Completed

 

 2014  75229  Pulmonary Function Test  Completed

 

 08/15/2014  32773  Injection  Completed

 

 2014  37876  Injection  Completed

 

 2014  46783  Injection  Completed

 

 2014  03430  Injection  Completed

 

 2014  56400  Injection  Completed

 

 2014  53881  Injection  Completed

 

 2014  43713  Extract 1-10  Completed

 

 2014  73912  Injection  Completed

 

 2014  23868  Injection  Completed

 

 2014  99126  Injection  Completed

 

 2014  41143  Injection  Completed

 

 2014  01360  Injection  Completed

 

 2014  42621  Injection  Completed

 

 2014  43584  Injection  Completed

 

 2014  41817  Injection  Completed

 

 2014  23198  Injection  Completed

 

 2014  88556  Extract 1-10  Completed

 

 2014  45935  Injection  Completed

 

 2014  85021  Injection  Completed

 

 2014  87685  Injection  Completed

 

 2014  22667  Injection  Completed

 

 2014  82419  Injection  Completed

 

 2014  09642  Injection  Completed

 

 2014  96562  Injection  Completed

 

 2014  97510  Injection  Completed

 

 2014  48210  Injection  Completed

 

 2014  21325  Injection  Completed

 

 2014  66876  Injection  Completed

 

 2013  56005  Extract 1-10  Completed

 

 2013  03947  Oxygen Level - Pulse Oximiter  Completed

 

 10/24/2013  76441  Oxygen Level - Pulse Oximiter  Completed

 

 10/24/2013  33166  Pre PFT  Completed

 

 10/03/2013  72083  Skin Test Scratch # Of Units ____  Completed

 

 10/03/2013  53242  Oxygen Level - Pulse Oximiter  Completed

 

 10/03/2013  18334  Pulmonary Function Test  Completed







Encounters







 Type  Date  Location  Provider  Dx  Diagnosis

 

 Office Visit  08/10/2018  Nena Reyes  J30.1  Allergic rhinitis 
due



   11:20a    M.D.    to pollen









 J30.2  Other seasonal allergic rhinitis

 

 J30.81  Allergic rhinitis due to animal (cat) (dog) hair and dander

 

 J30.89  Other allergic rhinitis

 

 J45.40  Moderate persistent asthma, uncomplicated

 

 L50.9  Urticaria, unspecified









 Office Visit  2018 11:20a  KANDI Womack30.1  Allergic 
rhinitis



       M.D.    due to pollen









 J30.2  Other seasonal allergic rhinitis

 

 J30.81  Allergic rhinitis due to animal (cat) (dog) hair and dander

 

 J30.89  Other allergic rhinitis

 

 J45.40  Moderate persistent asthma, uncomplicated

 

 L50.9  Urticaria, unspecified









 Office Visit  2018  3:20p  KANDI [id number]  Other 
allergic



       M.D.    rhinitis









 J30.81  Allergic rhinitis due to animal (cat) (dog) hair and dander

 

 J30.2  Other seasonal allergic rhinitis

 

 J30.1  Allergic rhinitis due to pollen

 

 J45.40  Moderate persistent asthma, uncomplicated









 Office Visit  2017  3:00p  [place]  Ele M Pieretti,  J30.1  Allergic 
rhinitis



       M.D.    due to pollen









 J30.81  Allergic rhinitis due to animal (cat) (dog) hair and dander

 

 J30.2  Other seasonal allergic rhinitis

 

 J45.40  Moderate persistent asthma, uncomplicated

 

 L50.9  Urticaria, unspecified









 Office Visit  2017  4:20p  Nena Reyes  J30.89  Other 
allergic



       M.D.    rhinitis









 J30.81  Allergic rhinitis due to animal (cat) (dog) hair and dander

 

 J30.2  Other seasonal allergic rhinitis

 

 J30.1  Allergic rhinitis due to pollen

 

 J45.40  Moderate persistent asthma, uncomplicated

 

 L50.9  Urticaria, unspecified









 Office Visit  2017  9:20a  Nena Reyes  J45.40  Moderate 
persistent



       M.D.    asthma, uncomplicated









 J45.40  Moderate persistent asthma, uncomplicated

 

 J30.81  Allergic rhinitis due to animal (cat) (dog) hair and dander

 

 J30.2  Other seasonal allergic rhinitis

 

 J30.1  Allergic rhinitis due to pollen

 

 Z68.37  Body mass index (BMI) 37.0-37.9, adult









 Office Visit  2017 11:40a  Nena Reyes  J30.89  Other 
allergic



       M.D.    rhinitis









 J30.81  Allergic rhinitis due to animal (cat) (dog) hair and dander

 

 J45.40  Moderate persistent asthma, uncomplicated

 

 J30.2  Other seasonal allergic rhinitis

 

 Z23  Encounter for immunization

 

 Z68.37  Body mass index (BMI) 37.0-37.9, adult









 Office Visit  2016  3:20p  Nena CHAU45.40  Moderate persistent



       Cortes, M.D.    asthma, uncomplicated









 J30.89  Other allergic rhinitis

 

 Z23  Encounter for immunization

 

 Z68.37  Body mass index (BMI) 37.0-37.9, adult









 Office Visit  2016 11:20a  Nena XIONG  J45.40  Moderate persistent



       Cortes, M.D.    asthma, uncomplicated









 J45.40  Moderate persistent asthma, uncomplicated

 

 J30.89  Other allergic rhinitis

 

 J30.89  Other allergic rhinitis

 

 Z68.37  Body mass index (BMI) 37.0-37.9, adult

 

 Z68.37  Body mass index (BMI) 37.0-37.9, adult









 Office Visit  2016 11:00a  Nena Cortes  J30.89  Other 
allergic



       M.D.    rhinitis









 J45.40  Moderate persistent asthma, uncomplicated

 

 Z68.37  Body mass index (BMI) 37.0-37.9, adult

 

 Z23  Encounter for immunization









 Office Visit  2014  3:00p  [place],  477.8  Rhinitis 
Allergic Due



       RPA-C    To Other Allergen









 477.0  Rhinitis Allergic Due To Pollen

 

 493.00  Asthma Extrinsic Unspecified

 

 530.81  Esophageal Reflux

 

 307.49  Sleep Disorder Other









 Office Visit  2013 11:00a  Nena Cortes,  477.8  Rhinitis 
Allergic



       M.D.    Due To Other



           Allergen









 493.00  Asthma Extrinsic Unspecified









 Office Visit  10/24/2013 11:00a  Nena Cortes,  477.8  Rhinitis 
Allergic



       M.D.    Due To Other



           Allergen









 493.00  Asthma Extrinsic Unspecified









 Office Visit  10/03/2013  9:00a  Nena Cortes,  477.8  Rhinitis 
Allergic



       M.D.    Due To Other



           Allergen









 995.60  Anaphylactic Shock Due To Unspec Food

 

 493.00  Asthma Extrinsic Unspecified







Plan of Treatment

Future Appointment(s):10/19/2018 11:30 am - Allergy Injection at [place]

## 2018-11-14 NOTE — XMS REPORT
Continuity of Care Document (CCD)

 Created on:2018



Patient:Tay Galvez

Sex:Male

:1965

External Reference #:[id number]





Demographics







 Address  [address] [place] [address]

 

 Mobile Phone  [phone]

 

 Email Address  [email or web address]

 

 Preferred Language  en

 

 Marital Status  Declined to Specify/Unknown

 

 Christian Affiliation  Unknown

 

 Race  Unknown

 

 Ethnic Group  Declined to Specify/Unknown









Author







 Name  Ele Reyes M.D.

 

 Address  [address] [facility]



   Unavailable



   [place] [address]









Care Team Providers







 Name  Role  Phone

 

 Haile Bach M.D.  Care Team Information   Unavailable

 

 Haile Bach M.D.  Primary Care Physician  Unavailable









Payers







 Type  Date  Identification Numbers  Payment Provider  Subscriber

 

   Effective:  Policy Number: [id number]  BC/BS Of CALL[place]  Tay Galvez



   10/01/2014      









 PayID: [id number]  [facility] [id number] [place] [id number]







Advance Directives







 Description

 

 No Information Available







Problems







 Date  Description  Provider  Status

 

 Onset: 10/02/2015  Allergic rhinitis due to pollen  Allergy Injection  Active

 

 Onset: 10/02/2015  Allergic rhinitis  Allergy Injection  Active

 

 Onset: 10/02/2015  Allergic rhinitis due to animals  Allergy Injection  Active

 

 Onset: 10/02/2015  Allergic rhinitis  Allergy Injection  Active

 

 Onset: 2017  Urticaria  Ele Reyes M.D.  Active

 

 Onset: 2017  Uncomplicated moderate persistent  Ele Reyes M.D.  
Active



   asthma    

 

 Onset: 2016  Uncomplicated moderate persistent  Alexis Cortes M.D.
  Active



   asthma    

 

 Onset: 2016  Immunization  Alexis Cortes M.D.  Active

 

 Onset: 2016  Body mass index 30+ - obesity  Alexis Cortes M.D.  
Active







Family History







 Date  Family Member(s)  Problem(s)  Comments

 

   General  Diabetes  sister

 

   General  Food Allergy  mother (strawberries), brother (peanuts, eggs)







Social History







 Type  Date  Description  Comments

 

 Birth Sex    Unknown  

 

 Marital Status    Legal Status: Domestic  



     Partner  

 

 Lives With    Female Partner  

 

 Home Environment    Does not use air   

 

 Home Environment    Does not have an air  



     conditioner  

 

 Home Environment    Stairs are present  

 

 Home Environment    Unfinished Basement  

 

 Home Environment    The basement is damp and  



     dehumidifier used  

 

 Home Environment    [employer]  

 

 Home Environment    Mattress is 5 years old  

 

 Home Environment    Regular Mattress  

 

 Home Environment    Mattress is not encased in an  



     allergy proof case  

 

 Home Environment    Pillows are not encased in an  



     allergy proof case  

 

 Home Environment    Pillows are polyester  

 

 Home Environment    Uses a dehumidifier  

 

 Home Environment    There are draperies in the  



     home  

 

 Home Environment    The home is priyank  

 

 Home Environment    The floors are wood  

 

 Home Environment    The floors are tile  

 

 Home Environment    The floors are carpeted  

 

 Home Environment    Uses hot water heating  

 

 Home Environment    [place]  

 

 Home Environment    Water Source: Well  

 

 Smoke-Free    Home is smoke-free  

 

 Pets    3 cats  

 

 Pets    Horse  

 

 Pets    1 dog  

 

 Pets    Animals sleep in bedroom  

 

 ETOH Use    Occasionally consumes alcohol  

 

 Tobacco Use  Start: Unknown End:  Patient is a former smoker  



   Unknown    

 

 Recreational Drug Use    Denies Drug Use  







Allergies, Adverse Reactions, Alerts







 Description

 

 No Known Drug Allergies







Medications







 Medication  Date  Status  Form  Strength  Qnty  SIG  Indications  Ordering



                 Provider

 

 Dulera    Active  Aerosol  200-5mcg/  13gm  inhale 2  J30.89  Ele M



         Act    puffs    Amy,



             twice    M.D.



             daily    

 

 Proair HFA    Active  Aerosol  108(90Bas  1inha  inhale two    Corrie



   /      e)  ler  puffs by    Dian,



         mcg/Act    mouth    FNP-C



             every 4    



             hours as    



             needed for    



             cough,    



             wheezing    



             or    



             difficulty    



             breathing    

 

 Montelukast  10/24  Active  Tablets  10mg  30tab  take one  J45.40  Sybil



 Sodium          s  tablet by    Fregoso-Jam



             mouth    georgia,



             every day    FNP-C

 

 Fluticasone  10/03  Active  Suspension  50mcg/Act  16uni  Inhale One  J30.89  
Scot



 Propionate          ts  To Two    Rubinstei



             Sprays In    n, M.D.



             Each    



             Nostril    



             Every Day    



             pt needs    



             to make    



             f/u    



             appointmen    



             t for    



             further rx    

 

 Lexapro    Active  Tablets  10mg    1 po qd    Unknown



                 

 

 Norvasc    Active  Tablets  5mg    1 By Mouth    Unknown



   /0000          Once Daily    

 

 Amlodipine    Active  Tablets  5mg    1 po qd    Unknown



 Bes              

 

 Hydrochlorothiazi    Active  Tablets  25mg    1 tablet    Unknown



           every day    

 

 Metformin HCL    Active  Tablets  500mg    Take three    Unknown



   /0000          times    



             daily    

 

 Fexofenadine HCL    Active  Tablets  180mg    1 by mouth    Unknown



   /0000          every day    

 

 Nucynta ER    Active  Tablets ER  150mg        Unknown



   /0000    12HR          

 

 Albuterol Sulfate    Active  Nebulizer  1.25mg/3M        Darlow,



         L        Haile WALTON M.D.

 

 Nexium 24HR    Active  Capsules DR  40mg    BiD    Unknown



   /0000              

 

 Spiriva Respimat    Active  Aerosol  1.25mcg/A  4gm  2 puffs    Ele       ct    once daily    EDEL Reyes

 

 Sucralfate    Active  Tablets  1gm        Niki              EDEL Montoya







Medications Administered in Office







 Medication  Date  Status  Form  Strength  Qnty  SIG  Indications  Ordering



                 Provider

 

 Injection  10/17/20  Administered  Injection          Allergy



   18              Injection

 

 Injection  10/12/20  Administered  Injection          Allergy



   18              Injection

 

 Injection  20  Administered  Injection          Allergy



   18              Injection

 

 Injection  20  Administered  Injection          Allergy



   18              Injection

 

 Injection  20  Administered  Injection          Allergy



   18              Injection

 

 Injection  20  Administered  Injection          Allergy



   18              Injection

 

 Injection  20  Administered  Injection          Allergy



   18              Injection

 

 Injection  20  Administered  Injection          Ele MENSAH



   18              EDEL Reyes

 

 Injection  20  Administered  Injection          Allergy



   18              Injection

 

 Injection  20  Administered  Injection          Allergy



   18              Injection

 

 Injection  06/15/20  Administered  Injection          Allergy



   18              Injection

 

 Injection  20  Administered  Injection          Allergy



   18              Injection

 

 Injection  20  Administered  Injection          Allergy



   18              Injection

 

 Injection  20  Administered  Injection          Allergy



   18              Injection

 

 Injection  20  Administered  Injection          Allergy



   18              Injection

 

 Injection  20  Administered  Injection          Allergy



   18              Injection

 

 Injection  20  Administered  Injection          Allergy



   18              Injection

 

 Injection  20  Administered  Injection          Allergy



   18              Injection

 

 Injection  20  Administered  Injection          Allergy



   18              Injection

 

 Injection  20  Administered  Injection          Allergy



   17              Injection

 

 Injection  12/15/20  Administered  Injection          Ele MENSAH



   17              EDEL Reyes

 

 Injection  12/15/20  Administered  Injection          Allergy



   17              Injection

 

 Injection  20  Administered  Injection          Allergy



   17              Injection

 

 Injection  20  Administered  Injection          Allergy



   17              Injection

 

 Injection  10/20/20  Administered  Injection          Allergy



   17              Injection

 

 Injection  10/06/20  Administered  Injection          Allergy



   17              Injection

 

 Injection  20  Administered  Injection          Allergy



   17              Injection

 

 Injection  20  Administered  Injection          Allergy



   17              Injection

 

 Injection  20  Administered  Injection          Allergy



   17              Injection

 

 Injection  20  Administered  Injection          Allergy



   17              Injection

 

 Injection  20  Administered  Injection          Allergy



   17              Injection

 

 Injection  20  Administered  Injection          Allergy



   17              Injection

 

 Injection  20  Administered  Injection          Alexis Cortes M.D.

 

 Injection  20  Administered  Injection          Allergy



   17              Injection

 

 Injection  20  Administered  Injection          Allergy



   17              Injection

 

 Injection  20  Administered  Injection          Alexis XIONG



   17              EDEL Cortes

 

 Injection  20  Administered  Injection          Allergy



   17              Injection

 

 Injection  20  Administered  Injection          Alexis XIONG



   17              EDEL Cortes

 

 Injection  20  Administered  Injection          Allergy



   17              Injection

 

 Injection  20  Administered  Injection          Allergy



   17              Injection

 

 Injection  20  Administered  Injection          Allergy



   17              Injection

 

 Injection  20  Administered  Injection          Allergy



   17              Injection

 

 Injection  20  Administered  Injection          Allergy



   17              Injection

 

 Injection  03/10/20  Administered  Injection          Allergy



   17              Injection

 

 Injection  20  Administered  Injection          Allergy



   17              Injection

 

 Injection  20  Administered  Injection          Allergy



   17              Injection

 

 Injection  20  Administered  Injection          Allergy



   17              Injection

 

 Injection  20  Administered  Injection          Allergy



   17              Injection

 

 Injection  10/28/20  Administered  Injection          Allergy



   16              Injection

 

 Injection  10/14/20  Administered  Injection          Allergy



   16              Injection

 

 Injection  20  Administered  Injection          Allergy



   16              Injection

 

 Injection  20  Administered  Injection          Allergy



   16              Injection

 

 Injection  20  Administered  Injection          Allergy



   16              Injection

 

 Injection  20  Administered  Injection          Allergy



   16              Injection

 

 Injection  20  Administered  Injection          Allergy



   16              Injection

 

 Injection  20  Administered  Injection          Allergy



   16              Injection

 

 Injection  20  Administered  Injection          Allergy



   16              Injection

 

 Injection  06/10/20  Administered  Injection          Allergy



   16              Injection

 

 Injection  20  Administered  Injection          Allergy



   16              Injection

 

 Injection  20  Administered  Injection          Allergy



   16              Injection

 

 Injection  20  Administered  Injection          Allergy



   16              Injection

 

 Injection  20  Administered  Injection          Allergy



   16              Injection

 

 Injection  20  Administered  Injection          Allergy



   16              Injection

 

 Injection  20  Administered  Injection          Allergy



   16              Injection

 

 Injection  20  Administered  Injection          Allergy



   16              Injection

 

 Injection  20  Administered  Injection          Allergy



   16              Injection

 

 Injection  20  Administered  Injection          Allergy



   16              Injection

 

 Injection  20  Administered  Injection          Allergy



   16              Injection

 

 Injection  20  Administered  Injection          Allergy



   16              Injection

 

 Injection  20  Administered  Injection          Allergy



   15              Injection

 

 Injection  20  Administered  Injection          Allergy



   15              Injection

 

 Injection  10/30/20  Administered  Injection          Allergy



   15              Injection

 

 Injection  10/16/20  Administered  Injection          Allergy



   15              Injection

 

 Injection  10/02/20  Administered  Injection          Allergy



   15              Injection

 

 Injection  /20  Administered  Injection          Allergy



   15              Injection

 

 Injection  /20  Administered  Injection          Allergy



   15              Injection

 

 Injection  20  Administered  Injection          Allergy



   15              Injection

 

 Injection  /  Administered  Injection          Allergy



   15              Injection

 

 Injection  20  Administered  Injection          Allergy



   15              Injection

 

 Injection  07/10/20  Administered  Injection          Allergy



   15              Injection

 

 Injection  20  Administered  Injection          Allergy



   15              Injection

 

 Injection  20  Administered  Injection          Allergy



   15              Injection

 

 Injection  20  Administered  Injection          Allergy



   15              Injection

 

 Injection  05/15/20  Administered  Injection          Allergy



   15              Injection

 

 Injection  0520  Administered  Injection          Allergy



   15              Injection

 

 Injection  20  Administered  Injection          Allergy



   15              Injection

 

 Injection  20  Administered  Injection          Allergy



   15              Injection

 

 Injection  /20  Administered  Injection          Allergy



   15              Injection

 

 Injection  20  Administered  Injection          Allergy



   15              Injection

 

 Injection  /20  Administered  Injection          Allergy



   15              Injection

 

 Injection  20  Administered  Injection          Allergy



   15              Injection

 

 Injection  20  Administered  Injection          Allergy



   15              Injection

 

 Injection  20  Administered  Injection          Allergy



   15              Injection

 

 Injection  20  Administered  Injection          Allergy



   14              Injection

 

 Injection  20  Administered  Injection          Alexis W.



   14              EDEL Cortes

 

 Injection  20  Administered  Injection          Allergy



   14              Injection

 

 Injection  20  Administered  Injection          Allergy



   14              Injection

 

 Injection  20  Administered  Injection          Allergy



   14              Injection

 

 Injection  20  Administered  Injection          Allergy



   14              Injection

 

 Injection  10/24/20  Administered  Injection          Allergy



   14              Injection

 

 Injection  10/10/20  Administered  Injection          Allergy



   14              Injection

 

 Injection  /20  Administered  Injection          Alexis W.



   14              EDEL Cortes

 

 Injection  /20  Administered  Injection          Allergy



   14              Injection

 

 Injection  20  Administered  Injection          Allergy



   14              Injection

 

 Injection  /20  Administered  Injection          Allergy



   14              Injection

 

 Injection  08/15/20  Administered  Injection          Allergy



   14              Injection

 

 Injection  0820  Administered  Injection          Allergy



   14              Injection

 

 Injection  18/20  Administered  Injection          Allergy



   14              Injection

 

 Injection  20  Administered  Injection          Allergy



   14              Injection

 

 Injection  20  Administered  Injection          Allergy



   14              Injection

 

 Injection  0620/20  Administered  Injection          Allergy



   14              Injection

 

 Injection  06/20  Administered  Injection          Allergy



   14              Injection

 

 Injection  06//20  Administered  Injection          Allergy



   14              Injection

 

 Injection  0530/20  Administered  Injection          Allergy



   14              Injection

 

 Injection  05/16/20  Administered  Injection          Allergy



   14              Injection

 

 Injection  05/20  Administered  Injection          Allergy



   14              Injection

 

 Injection  05//20  Administered  Injection          Allergy



   14              Injection

 

 Injection  20  Administered  Injection          Allergy



   14              Injection

 

 Injection  20  Administered  Injection          Allergy



   14              Injection

 

 Injection  20  Administered  Injection          Allergy



   14              Injection

 

 Injection  20  Administered  Injection          Allergy



   14              Injection

 

 Injection  20  Administered  Injection          Allergy



   14              Injection

 

 Injection  20  Administered  Injection          Allergy



   14              Injection

 

 Injection  20  Administered  Injection          Allergy



   14              Injection

 

 Injection  20  Administered  Injection          Allergy



   14              Injection

 

 Injection  20  Administered  Injection          Allergy



   14              Injection

 

 Injection  20  Administered  Injection          Allergy



   14              Injection

 

 Injection  20  Administered  Injection          Allergy



   14              Injection

 

 Injection  20  Administered  Injection          Allergy



   14              Injection

 

 Injection  20  Administered  Injection          Allergy



   14              Injection

 

 Injection  20  Administered  Injection          Allergy



   14              Injection







Immunizations







 Description

 

 No Information Available







Vital Signs







 Date  Vital  Result  Comment

 

 10/17/2018 11:32am  Height  70.5 inches  5'10.50"









 Weight  257.00 lb  

 

 Weight  116.575 kg  

 

 Respiratory Rate  20 /min  

 

 Heart Rate  97 /min  

 

 O2 % BldC Oximetry  98 %  

 

 BP Systolic  128 mmHg  

 

 BP Diastolic  80 mmHg  

 

 Asthma Control Test  13  

 

 BMI (Body Mass Index)  36.4 kg/m2  









 08/10/2018 12:04pm  Height  70.5 inches  5'10.50"









 Weight  265.00 lb  

 

 Weight  120.204 kg  

 

 Respiratory Rate  20 /min  

 

 Heart Rate  87 /min  

 

 O2 % BldC Oximetry  96 %  

 

 BP Systolic  144 mmHg  

 

 BP Diastolic  85 mmHg  

 

 Asthma Control Test  15  

 

 BMI (Body Mass Index)  37.5 kg/m2  









 2018 11:30am  Height  70.5 inches  5'10.50"









 Weight  240.00 lb  

 

 Weight  108.864 kg  

 

 Respiratory Rate  22 /min  

 

 Heart Rate  81 /min  

 

 O2 % BldC Oximetry  97 %  

 

 BP Systolic  129 mmHg  

 

 BP Diastolic  86 mmHg  

 

 Asthma Control Test  21  

 

 BMI (Body Mass Index)  33.9 kg/m2  









 2018  3:09pm  Height  70.5 inches  5'10.50"









 Weight  240.00 lb  

 

 Weight  108.864 kg  

 

 Respiratory Rate  18 /min  

 

 Heart Rate  95 /min  

 

 O2 % BldC Oximetry  98 %  

 

 BP Systolic  126 mmHg  

 

 BP Diastolic  88 mmHg  

 

 Asthma Control Test  21  

 

 BMI (Body Mass Index)  33.9 kg/m2  









 2017  3:08pm  Height  70.5 inches  5'10.50"









 Weight  261.00 lb  

 

 Weight  118.390 kg  

 

 Respiratory Rate  16 /min  

 

 Heart Rate  81 /min  

 

 O2 % BldC Oximetry  97 %  

 

 BP Systolic  128 mmHg  

 

 BP Diastolic  76 mmHg  

 

 Asthma Control Test  22  

 

 BMI (Body Mass Index)  36.9 kg/m2  









 2017  4:11pm  Height  70.5 inches  5'10.50"









 Weight  265.00 lb  

 

 Weight  120.204 kg  

 

 Respiratory Rate  16 /min  

 

 Heart Rate  85 /min  

 

 O2 % BldC Oximetry  97 %  

 

 BP Systolic  126 mmHg  

 

 BP Diastolic  79 mmHg  

 

 Asthma Control Test  24  

 

 BMI (Body Mass Index)  37.5 kg/m2  









 2017  9:18am  Height  70.5 inches  5'10.50"









 Weight  267.00 lb  

 

 Weight  121.111 kg  

 

 Respiratory Rate  12 /min  

 

 Heart Rate  76 /min  

 

 O2 % BldC Oximetry  97 %  

 

 BP Systolic  121 mmHg  

 

 BP Diastolic  77 mmHg  

 

 Asthma Control Test  22  

 

 BMI (Body Mass Index)  37.8 kg/m2  









 2017 11:41am  Height  70.5 inches  5'10.50"









 Weight  262.00 lb  

 

 Weight  118.843 kg  

 

 Respiratory Rate  16 /min  

 

 Heart Rate  73 /min  

 

 O2 % BldC Oximetry  97 %  

 

 BP Systolic  136 mmHg  

 

 BP Diastolic  88 mmHg  

 

 BMI (Body Mass Index)  37.1 kg/m2  









 2016  2:59pm  Height  70 inches  5'10"









 Weight  258.00 lb  

 

 Weight  117.029 kg  

 

 Respiratory Rate  18 /min  

 

 Heart Rate  88 /min  

 

 O2 % BldC Oximetry  98 %  

 

 BP Systolic  134 mmHg  

 

 BP Diastolic  76 mmHg  

 

 BMI (Body Mass Index)  37.0 kg/m2  









 2016 11:31am  Height  70 inches  5'10"









 Weight  263.00 lb  

 

 Weight  119.297 kg  

 

 Respiratory Rate  16 /min  

 

 Heart Rate  74 /min  

 

 O2 % BldC Oximetry  98 %  

 

 BP Systolic  131 mmHg  

 

 BP Diastolic  78 mmHg  

 

 Asthma Control Test  20  

 

 BMI (Body Mass Index)  37.7 kg/m2  









 2016 11:05am  Height  70 inches  5'10"









 Weight  262.00 lb  

 

 Weight  118.843 kg  

 

 Heart Rate  78 /min  

 

 O2 % BldC Oximetry  97 %  

 

 BP Systolic  122 mmHg  

 

 BP Diastolic  80 mmHg  

 

 Asthma Control Test  24  

 

 BMI (Body Mass Index)  37.6 kg/m2  









 2014  3:08pm  Height  70 inches  5'10"









 Weight  258.00 lb  

 

 Weight  117.029 kg  

 

 Respiratory Rate  18 /min  

 

 Heart Rate  98 /min  

 

 O2 % BldC Oximetry  96 %  

 

 BP Systolic  122 mmHg  

 

 BP Diastolic  88 mmHg  

 

 Asthma Control Test  14  

 

 BMI (Body Mass Index)  37.0 kg/m2  









 2013 11:07am  Height  70 inches  5'10"









 Weight  246.00 lb  

 

 Weight  111.586 kg  

 

 Respiratory Rate  18 /min  

 

 Heart Rate  66 /min  

 

 O2 % BldC Oximetry  97 %  

 

 BP Systolic  136 mmHg  

 

 BP Diastolic  88 mmHg  

 

 Asthma Control Test  15  

 

 BMI (Body Mass Index)  35.3 kg/m2  









 10/24/2013 11:01am  Weight  240.00 lb  









 Weight  108.864 kg  

 

 Respiratory Rate  16 /min  

 

 Heart Rate  72 /min  

 

 O2 % BldC Oximetry  98 %  









 10/03/2013  8:58am  Height  71 inches  5'11"









 Weight  240.00 lb  

 

 Weight  108.864 kg  

 

 Respiratory Rate  14 /min  

 

 Heart Rate  67 /min  

 

 O2 % BldC Oximetry  98 %  

 

 BP Systolic  160 mmHg  

 

 BP Diastolic  110 mmHg  

 

 BMI (Body Mass Index)  33.5 kg/m2  







Results







 Description

 

 No Information Available







Procedures







 Date  Code  Description  Status

 

 10/17/2018  59820  Injection  Completed

 

 10/17/2018  13608  Pre PFT  Completed

 

 10/12/2018  00103  Injection  Completed

 

 2018  41429  Injection  Completed

 

 2018  15950  Extract 1-10  Completed

 

 2018  16904  Injection  Completed

 

 2018  17332  Injection  Completed

 

 2018  79465  Injection  Completed

 

 08/10/2018  63601  Pre PFT  Completed

 

 2018  22163  Injection  Completed

 

 2018  74791  Injection  Completed

 

 2018  15536  Injection  Completed

 

 2018  69103  Injection  Completed

 

 06/15/2018  79395  Injection  Completed

 

 2018  40794  Injection  Completed

 

 2018  52224  Injection  Completed

 

 2018  79369  Injection  Completed

 

 2018  82801  Extract 1-10  Completed

 

 2018  04593  Injection  Completed

 

 2018  04122  Injection  Completed

 

 2018  65257  Pre PFT  Completed

 

 2018  50368  Injection  Completed

 

 2018  03780  Injection  Completed

 

 2018  10695  Injection  Completed

 

 2017  19254  Injection  Completed

 

 12/15/2017  85727  Injection  Completed

 

 12/15/2017  43439  Injection  Completed

 

 2017  55821  Injection  Completed

 

 2017  89024  Injection  Completed

 

 10/20/2017  45294  Injection  Completed

 

 10/06/2017  38815  Injection  Completed

 

 10/06/2017  14927  Extract 1-10  Completed

 

 2017  08551  Injection  Completed

 

 2017  42339  Injection  Completed

 

 2017  79500  Injection  Completed

 

 2017  25646  Pre PFT  Completed

 

 2017  15293  Injection  Completed

 

 2017  43324  Injection  Completed

 

 2017  74632  Injection  Completed

 

 2017  32983  Injection  Completed

 

 2017  76362  Injection  Completed

 

 2017  29498  Injection  Completed

 

 2017  49060  Injection  Completed

 

 2017  76022  Injection  Completed

 

 2017  81600  Extract 1-10  Completed

 

 2017  86244  Extract 1-10  Completed

 

 2017  46500  Injection  Completed

 

 2017  12279  Injection  Completed

 

 2017  20691  Injection  Completed

 

 2017  51802  Injection  Completed

 

 2017  05972  Pre PFT  Completed

 

 2017  52844  Injection  Completed

 

 2017  29717  Injection  Completed

 

 03/10/2017  43460  Injection  Completed

 

 2017  30315  Injection  Completed

 

 2017  53058  Injection  Completed

 

 2017  28469  Injection  Completed

 

 2017  47398  Injection  Completed

 

 2017  01276  Pre PFT  Completed

 

 10/28/2016  53390  Injection  Completed

 

 10/28/2016  45870  Extract 1-10  Completed

 

 10/14/2016  67757  Injection  Completed

 

 2016  34642  Injection  Completed

 

 2016  75893  Injection  Completed

 

 2016  76359  Pre PFT  Completed

 

 2016  16048  Injection  Completed

 

 2016  45275  Injection  Completed

 

 2016  21280  Injection  Completed

 

 2016  66489  Pre PFT  Completed

 

 2016  34961  Injection  Completed

 

 2016  10107  Injection  Completed

 

 06/10/2016  19400  Injection  Completed

 

 2016  90831  Extract 1-10  Completed

 

 2016  54060  Injection  Completed

 

 2016  07887  Injection  Completed

 

 2016  84009  Injection  Completed

 

 2016  90441  Pre PFT  Completed

 

 2016  40938  Injection  Completed

 

 2016  57729  Injection  Completed

 

 2016  54523  Injection  Completed

 

 2016  84779  Injection  Completed

 

 2016  89032  Injection  Completed

 

 2016  06557  Injection  Completed

 

 2016  86294  Injection  Completed

 

 2016  17338  Extract 1-10  Completed

 

 2016  37896  Injection  Completed

 

 2015  07618  Injection  Completed

 

 2015  58352  Injection  Completed

 

 10/30/2015  60067  Injection  Completed

 

 10/16/2015  83638  Injection  Completed

 

 10/02/2015  19048  Injection  Completed

 

 2015  17828  Injection  Completed

 

 2015  18342  Injection  Completed

 

 2015  10414  Injection  Completed

 

 2015  25705  Injection  Completed

 

 2015  75313  Extract 1-10  Completed

 

 2015  42917  Injection  Completed

 

 07/10/2015  85775  Injection  Completed

 

 2015  85078  Injection  Completed

 

 2015  95418  Injection  Completed

 

 2015  07189  Injection  Completed

 

 05/15/2015  58660  Injection  Completed

 

 2015  94909  Injection  Completed

 

 2015  52819  Injection  Completed

 

 2015  65500  Injection  Completed

 

 2015  03090  Injection  Completed

 

 2015  04134  Extract 1-10  Completed

 

 2015  74978  Injection  Completed

 

 2015  51570  Injection  Completed

 

 2015  73336  Injection  Completed

 

 2015  85398  Injection  Completed

 

 2015  62485  Injection  Completed

 

 2014  13130  Injection  Completed

 

 2014  75699  Injection  Completed

 

 2014  75349  Injection  Completed

 

 2014  26884  Injection  Completed

 

 2014  54861  Injection  Completed

 

 2014  14176  Injection  Completed

 

 10/24/2014  94285  Extract 1-10  Completed

 

 10/24/2014  92001  Injection  Completed

 

 10/10/2014  11236  Injection  Completed

 

 2014  94113  Injection  Completed

 

 2014  64966  Injection  Completed

 

 2014  37653  Injection  Completed

 

 2014  07703  Injection  Completed

 

 2014  71903  Pulmonary Function Test  Completed

 

 2014  04101  Pulmonary Function Test  Completed

 

 08/15/2014  74893  Injection  Completed

 

 2014  28396  Injection  Completed

 

 2014  48899  Injection  Completed

 

 2014  82444  Injection  Completed

 

 2014  42666  Injection  Completed

 

 2014  85041  Injection  Completed

 

 2014  90762  Extract 1-10  Completed

 

 2014  53794  Injection  Completed

 

 2014  38901  Injection  Completed

 

 2014  41821  Injection  Completed

 

 2014  45634  Injection  Completed

 

 2014  37292  Injection  Completed

 

 2014  68407  Injection  Completed

 

 2014  48411  Injection  Completed

 

 2014  85391  Injection  Completed

 

 2014  70472  Injection  Completed

 

 2014  33315  Extract 1-10  Completed

 

 2014  61346  Injection  Completed

 

 2014  85018  Injection  Completed

 

 2014  53439  Injection  Completed

 

 2014  00065  Injection  Completed

 

 2014  15097  Injection  Completed

 

 2014  03525  Injection  Completed

 

 2014  11826  Injection  Completed

 

 2014  28583  Injection  Completed

 

 2014  47497  Injection  Completed

 

 2014  17345  Injection  Completed

 

 2014  29684  Injection  Completed

 

 2013  42292  Extract 1-10  Completed

 

 2013  29537  Oxygen Level - Pulse Oximiter  Completed

 

 10/24/2013  60570  Oxygen Level - Pulse Oximiter  Completed

 

 10/24/2013  91624  Pre PFT  Completed

 

 10/03/2013  20451  Skin Test Scratch # Of Units ____  Completed

 

 10/03/2013  11420  Oxygen Level - Pulse Oximiter  Completed

 

 10/03/2013  81439  Pulmonary Function Test  Completed







Encounters







 Type  Date  Location  Provider  Dx  Diagnosis

 

 Office Visit  10/17/2018  Nena Reyes  J30.1  Allergic rhinitis 
due



   11:40a    M.D.    to pollen









 J30.2  Other seasonal allergic rhinitis

 

 J30.81  Allergic rhinitis due to animal (cat) (dog) hair and dander

 

 J30.89  Other allergic rhinitis

 

 J45.40  Moderate persistent asthma, uncomplicated

 

 L50.9  Urticaria, unspecified









 Office Visit  08/10/2018 11:20a  KANDI Womack30.1  Allergic 
rhinitis



       M.D.    due to pollen









 J30.2  Other seasonal allergic rhinitis

 

 J30.81  Allergic rhinitis due to animal (cat) (dog) hair and dander

 

 J30.89  Other allergic rhinitis

 

 J45.40  Moderate persistent asthma, uncomplicated

 

 L50.9  Urticaria, unspecified









 Office Visit  2018 11:20a  KANDI Womack30.1  Allergic 
rhinitis



       M.D.    due to pollen









 J30.2  Other seasonal allergic rhinitis

 

 J30.81  Allergic rhinitis due to animal (cat) (dog) hair and dander

 

 J30.89  Other allergic rhinitis

 

 J45.40  Moderate persistent asthma, uncomplicated

 

 L50.9  Urticaria, unspecified









 Office Visit  2018  3:20p  Nena Reyes  J30.89  Other 
allergic



       M.D.    rhinitis









 J30.81  Allergic rhinitis due to animal (cat) (dog) hair and dander

 

 J30.2  Other seasonal allergic rhinitis

 

 J30.1  Allergic rhinitis due to pollen

 

 J45.40  Moderate persistent asthma, uncomplicated









 Office Visit  2017  3:00p  Nena Reyes  J30.1  Allergic 
rhinitis



       M.D.    due to pollen









 J30.81  Allergic rhinitis due to animal (cat) (dog) hair and dander

 

 J30.2  Other seasonal allergic rhinitis

 

 J45.40  Moderate persistent asthma, uncomplicated

 

 L50.9  Urticaria, unspecified









 Office Visit  2017  4:20p  Nena Reyes  J30.89  Other 
allergic



       M.D.    rhinitis









 J30.81  Allergic rhinitis due to animal (cat) (dog) hair and dander

 

 J30.2  Other seasonal allergic rhinitis

 

 J30.1  Allergic rhinitis due to pollen

 

 J45.40  Moderate persistent asthma, uncomplicated

 

 L50.9  Urticaria, unspecified









 Office Visit  2017  9:20a  Nena Reyes  J45.40  Moderate 
persistent



       M.D.    asthma, uncomplicated









 J45.40  Moderate persistent asthma, uncomplicated

 

 J30.81  Allergic rhinitis due to animal (cat) (dog) hair and dander

 

 J30.2  Other seasonal allergic rhinitis

 

 J30.1  Allergic rhinitis due to pollen

 

 Z68.37  Body mass index (BMI) 37.0-37.9, adult









 Office Visit  2017 11:40a  KANDI Womack30.Marvin  Other 
allergic



       M.D.    rhinitis









 J30.81  Allergic rhinitis due to animal (cat) (dog) hair and dander

 

 J45.40  Moderate persistent asthma, uncomplicated

 

 J30.2  Other seasonal allergic rhinitis

 

 Z23  Encounter for immunization

 

 Z68.37  Body mass index (BMI) 37.0-37.9, adult









 Office Visit  2016  3:20p  Nena XIONG  J45.40  Moderate persistent



       Cortes, M.D.    asthma, uncomplicated









 J30.89  Other allergic rhinitis

 

 Z23  Encounter for immunization

 

 Z68.37  Body mass index (BMI) 37.0-37.9, adult









 Office Visit  2016 11:20a  Ramsaybernadette XIONG  J45.40  Moderate persistent



       EDEL Cortes    asthma, uncomplicated









 J45.40  Moderate persistent asthma, uncomplicated

 

 J30.89  Other allergic rhinitis

 

 J30.89  Other allergic rhinitis

 

 Z68.37  Body mass index (BMI) 37.0-37.9, adult

 

 Z68.37  Body mass index (BMI) 37.0-37.9, adult









 Office Visit  2016 11:00a  Ramsay  Alexis Cortes  J30.89  Other 
allergic



       M.D.    rhinitis









 J45.40  Moderate persistent asthma, uncomplicated

 

 Z68.37  Body mass index (BMI) 37.0-37.9, adult

 

 Z23  Encounter for immunization









 Office Visit  2014  3:00p  Ramsay  Inge Chang Cervantes,  477.8  Rhinitis 
Allergic Due



       RPA-C    To Other Allergen









 477.0  Rhinitis Allergic Due To Pollen

 

 493.00  Asthma Extrinsic Unspecified

 

 530.81  Esophageal Reflux

 

 307.49  Sleep Disorder Other









 Office Visit  2013 11:00a  Ramsay  Alexis Cortes,  477.8  Rhinitis 
Allergic



       M.D.    Due To Other



           Allergen









 493.00  Asthma Extrinsic Unspecified









 Office Visit  10/24/2013 11:00a  Ramsay  Alexis Cortes,  477.8  Rhinitis 
Allergic



       M.D.    Due To Other



           Allergen









 493.00  Asthma Extrinsic Unspecified









 Office Visit  10/03/2013  9:00a  Ramsay  Alexis Cortes,  477.8  Rhinitis 
Allergic



       M.D.    Due To Other



           Allergen









 995.60  Anaphylactic Shock Due To Unspec Food

 

 493.00  Asthma Extrinsic Unspecified







Plan of Treatment

Future Appointment(s):10/26/2018 10:15 am - Allergy Injection at [id number] 11:00 am - Ele Reyes M.D. at [facility]/ - Ele Reyes M.D.J30.1 Allergic rhinitis due to eaoxusF68.2 Other seasonal allergic 
ybcmrpudL17.81 Allergic rhinitis due to animal (cat) (dog) hair and 
xsjhfmO11.89 Other allergic eqkdvhniL24.40 Moderate persistent asthma, 
rtvzxcidespydU57.9 Urticaria, unspecifiedFollow up:3 months, CHECK-UP/FOLLOW UP 
VISIT: Continued management of patient's medical care, sooner if 
neededRecommendations:pre PFT today  continue Dulera 200/5 2 puffs twice daily 
- use with spacer; rinse mouth after use continue Spiriva 2 puffs once daily 
continue the Proair 2 puffs every 4 hours as needed for cough, shortness of 
breath, wheezing, or chest congestion.  continue Montelukast 10 mg daily 
continue Fluticasone 2 sprays to each nostril once daily  continue IT  continue 
Fexofenadine 180 mg bid  continue f/u with PCP, GI    continue PPI bid

## 2018-11-14 NOTE — ED
Complex/Multi-Sys Presentation





- HPI Summary


HPI Summary: 





Mr. Galvez comes today with the primary concern that he has low back pain.  

He did some lifting a couple weeks ago and has had low back pain since which 

she aggravated by falling last evening.  He denies any change in bowels or 

bladder or weakness of his legs.  He fell last evening because he briefly 

fainted during an episode of laughing violently.  He reassures me that this 

sort of thing is not unusual for him and that his asthma specialist is aware of.





- History Of Current Complaint


Chief Complaint: UCBackPain


Time Seen by Provider: 11/14/18 17:33


Hx Obtained From: Patient


Onset/Duration: Gradual Onset, Lasting Weeks


Timing: Constant


Severity Currently: Mild


Severity Initially: Moderate


Location: Pain At: - low back


Character: Dull





- Allergies/Home Medications


Allergies/Adverse Reactions: 


 Allergies











Allergy/AdvReac Type Severity Reaction Status Date / Time


 


No Known Allergies Allergy   Verified 11/14/18 16:41











Home Medications: 


 Home Medications





Sucralfate [Carafate] 1 gm PO BID 11/14/18 [History Confirmed 11/14/18]











PMH/Surg Hx/FS Hx/Imm Hx


Endocrine/Hematology History: Reports: Hx Diabetes - pre-diabetes


   Denies: Hx Thyroid Disease


Cardiovascular History: Reports: Hx Hypercholesterolemia, Hx Hypertension


Respiratory History: Reports: Hx Asthma, Hx Chronic Obstructive Pulmonary 

Disease (COPD), Hx Pneumonia, Hx Seasonal Allergies, Hx Sleep Apnea - CPAP


GI History: Reports: Hx Gastroesophageal Reflux Disease


   Denies: Hx Gastrointestinal Bleed, Hx Ulcer


 History: 


   Denies: Hx Renal Disease


Musculoskeletal History: Reports: Hx Arthritis


Sensory History: 


   Denies: Hx Contacts or Glasses, Hx Hearing Aid


Opthamlomology History: 


   Denies: Hx Contacts or Glasses


Neurological History: 


   Denies: Hx Seizures, Hx Transient Ischemic Attacks (TIA)





- Surgical History


Surgery Procedure, Year, and Place: right foot - crushed calcaneous


Infectious Disease History: No


Infectious Disease History: Reports: History Other Infectious Disease - flub B 1 /17/18


   Denies: Hx Clostridium Difficile, Hx Hepatitis, Hx Human Immunodeficiency 

Virus (HIV), Hx of Known/Suspected MRSA, Hx Shingles, Hx Tuberculosis, Traveled 

Outside the [place] in Last 30 Days





- Family History


Known Family History: Positive: Hypertension


   Negative: Other - FHx of COPD or liver disease





- Social History


Alcohol Use: Occasionally


Substance Use Type: Reports: Marijuana


Smoking Status (MU): Former Smoker





Review of Systems


Constitutional: Negative


Eyes: Negative


ENT: Negative


Cardiovascular: Negative


Respiratory: Negative


Gastrointestinal: Negative


Genitourinary: Negative


Musculoskeletal: Other - see HPI


Skin: Negative


Neurological: Negative


Psychological: Normal


All Other Systems Reviewed And Are Negative: Yes





Physical Exam





- Summary


Physical Exam Summary: 





He is nontoxic in appearance with normal vital signs.


Triage Information Reviewed: Yes


Vital Signs On Initial Exam: 


 Initial Vitals











Temp Pulse Resp BP Pulse Ox


 


 98.2 F   116   18   153/110   99 


 


 11/14/18 16:36  11/14/18 16:36  11/14/18 16:36  11/14/18 16:36  11/14/18 16:36











Vital Signs Reviewed: Yes


Appearance: Positive: Well-Appearing, Pain Distress - mildly


Skin: Positive: Warm, Skin Color Reflects Adequate Perfusion, Dry


Eyes: Positive: Normal


ENT: Positive: Normal ENT inspection


Neck: Positive: Supple


Respiratory/Lung Sounds: Positive: Clear to Auscultation


Cardiovascular: Positive: Normal, RRR, Pulses are Symmetrical in both Upper and 

Lower Extremities


Abdomen Description: Positive: Nontender


Bowel Sounds: Positive: Present


Musculoskeletal: Positive: Abnormal @ - tender para lumbar/sacral


Neurological: Positive: Normal


Psychiatric: Positive: Normal





Diagnostics





- Vital Signs


 Vital Signs











  Temp Pulse Resp BP Pulse Ox


 


 11/14/18 16:36  98.2 F  116  18  153/110  99














- Laboratory


Lab Statement: Any lab studies that have been ordered have been reviewed, and 

results considered in the medical decision making process.





Complex Multi-Symp Course/Dx


Course Of Treatment: Mr. Galvez has some mild low back strain.  He does not 

want narcotics but is requesting a patch.  He is not concerned about his near 

syncopal episode last evening and I recommended that he follow-up with Dr. Bach for that.





- Diagnoses


Provider Diagnoses: 


 Low back strain








Discharge





- Sign-Out/Discharge


Documenting (check all that apply): Patient Departure


All imaging exams completed and their final reports reviewed: Yes





- Discharge Plan


Condition: Stable


Disposition: HOME


Patient Education Materials:  Low Back Strain (ED)


Referrals: 


Haile Bach MD [Primary Care Provider] - 


Ele Reyes MD [Medical Doctor] - 





- Billing Disposition and Condition


Condition: STABLE


Disposition: Home

## 2018-11-14 NOTE — XMS REPORT
Tay Galvez

 Created on:2018



Patient:Tay Galvez

Sex:Male

:1965

External Reference #:[id number]





Demographics







 Address  [address] [place] [address]

 

 Home Phone  [phone]

 

 Preferred Language  English

 

 Marital Status  Not  Or 

 

 Confucianist Affiliation  Unknown

 

 Race  White

 

 Ethnic Group  Not  Or 









Author







 Organization  [employer]

 

 Address  [address]



   [place] [address]

 

 Phone  [phone]









Support







 Name  Relationship  Address  Phone

 

 Natalie Wakefield  Wife  Unavailable  [phone]









Care Team Providers







 Name  Role  Phone

 

 Haile Bach MD  Primary Care Physician  Unavailable









Payers







 Type  Date  Identification Numbers  Payment Provider  Subscriber

 

 Commercial    Policy Number: [id number]  [employer]  Tay Galvez









 PayID: [id number]  [address] [address]









 [place] [address]







Problems







 Description

 

 No Information







Family History







 Date  Family Member(s)  Problem(s)  Comments

 

   First Sister  Diabetes Type I  







Social History







 Type  Date  Description  Comments

 

 Marital Status    Single  

 

 Occupation    Computers/Technology  

 

 ETOH Use    Denies alcohol use  

 

 Recreational Drug Use    Medical Marijuana  Document: 18 -



       History GS

 

 Smoking    Patient is a former smoker  

 

 Daily Caffeine    Consumes on average 1 cup of  



     regular coffee per day  

 

 Exercise Type/Frequency    Does not exercise  







Allergies, Adverse Reactions, Alerts







 Date  Description  Reaction  Status  Severity  Comments

 

 2018  NKDA    active    

 

 2005  Imipramine    inactive    high b/p, high hr

 

 2005  Codeine    inactive    itch

 

 2018  Oxycodone    inactive    







Medications







 Medication  Date  Status  Form  Strength  Qnty  SIG  Indications  Ordering



                 Provider

 

 Norvasc  07/15  Active  Tablets  5mg  30tab  1 PO qd            amber Cardenas M.D.

 

 Nexium  07/15  Active  Capsules  40mg  30cap  1 PO qd            amber Cardenas M.D.

 

 Hydrochlorothiazi  07/15  Active  Tablets  25mg  90tab  1 PO qd            amber Cardenas M.D.

 

 Atorvastatin    Active  Tablets  10mg    1 by mouth    Unknown



 Calcium  /0000          every day    

 

 Metformin HCL    Active  Tablets  500mg    1 by mouth    Unknown



   /0000          three    



             times  a    



             day    

 

 Flonase Allergy    Active  Suspension  50mcg/Act    spray 1    Unknown



 Relief            spray in    



             each    



             nostril    



             twice    



             daily as    



             needed    

 

 Proair Respiclick    Active  Aerosol  108(90Bas    2 puffs    Unknown



         e)    four times    



         mcg/Act    a day as    



             needed    

 

 Singulair    Active  Tablets  10mg    1 by mouth    Unknown



             every day    

 

 Dulera    Active  Aerosol  100-5mcg/    inhale two    Unknown



         Act    puffs by    



             mouth    



             twice a    



             day    

 

 Spiriva    Active  Capsules  18mcg    1 unit    Unknown



 Handihale          inhalation    



             daily    

 

 Sucralfate    Active  Tablets  1gm    1by mouth    Unknown



             four times    



             a day as    



             needed    

 

                 

 

 Mentax    Hx  Cream  1%                      ROXANA Cardenas M.D.

 

 Aspirin    Hx  Tablets  325mg  30tab  1 PO qd            amber Cardenas M.D.

 

 Zoloft  07/15  Hx  Tablets  100mg  30tab  1 po qd            amber Cardenas M.D.

 

 Zoloft  07/15  Hx  Tablets  25mg  90tab  1 PO qd            amber Cardenas M.D.

 

 Flonase  07/15  Hx  Suspension  50mcg/Spr  16gm  prn          ay        ROXANA Cardenas,



   .DXena



                 

 

 Imipramine HCL  07/15  Hx  Tablets  50mg  30tab  2 q am, 1            s  q pm    ROXANA Cardenas



                 M.DXena



                 

 

 Advair Diskus  07/15  Hx  Inhaler  250mcg;50    1 puff bid          mcg        ROXANA Cardenas M.D.

 

 Triamcinolone  07/15  Hx  Cream  0.1%  60gm  apply prn    



 Acetonide                ROXANA Cardenas



   .Xena



                 

 

 Flexeril  07/15  Hx  Tablets  10mg  30tab  prn            s      ROXANA Cardenas



                 M.DXena



                 

 

 Albuterol  07/15  Hx  Aerosol  90mcg/Dos    2 Puffs    Cristino



 Inhalation        carrington Cardenas M.D.







Vital Signs







 Date  Vital  Result  Comment

 

 2018  Height  70 inches  5'10"









 Weight  260.00 lb  

 

 Heart Rate  76 /min  

 

 BP Systolic  132 mmHg  

 

 BP Diastolic  80 mmHg  

 

 Respiratory Rate  18 /min  

 

 Body Temperature  97.8 F  

 

 BMI (Body Mass Index)  37.3 kg/m2  









 2005  Height  70 inches  5'10"









 Weight  238.00 lb  

 

 Heart Rate  74 /min  

 

 BP Systolic Sitting  122 mmHg  left arm, right arm 120/84

 

 BP Diastolic Sitting  84 mmHg  left arm, right arm 120/84

 

 BP Systolic Standing  124 mmHg  

 

 BP Diastolic Standing  90 mmHg  

 

 BMI (Body Mass Index)  34.1 kg/m2  







Results







 Description

 

 No Information







Procedures







 Date  CPT Code  Description  Status

 

 2012  73210  Nerve Conduction, Sensory  Completed

 

 2012  10336  Nerve Conduction, Motor W/F-Wave Study  Completed

 

 2012  52923  Nerve Conduction, Motor W/O F-Wave Study  Completed

 

 2012  54176  Needle Electromyography Complete, Five Or More Muscles  
Completed



     Studied  

 

 2005  78808  ECHO/Stress  Completed

 

 2005  58601  Stress Test  Completed

 

 2005  70875  EKG Tracing & Interpretation  Completed







Encounters







 Type  Date  Location  Provider  CPT E/M  Dx

 

 Office Visit  2018  [facility],  [place],  [address]  
J11.1



   2:48p  Hospitalists  TALIA    









 J45.909

 

 E11.8









 Office Visit  2018  2:47p  [facility],  [place],  
[address]  J11.1



     Hospitalists  M.D.    









 J45.909

 

 E11.8









 Office Visit  2018  2:47p  [facility],  [place],  
[address]  J11.1



     Hospitalists  M.D.    









 J45.909

 

 E11.8









 Office Visit  2018  2:46p  [facility],  [place],  
[address]  J11.1



     Hospitalists  TORRI    









 J45.909

 

 E11.8

 

 I10









 Office Visit  2012  2:00p  [facility]  Mona Hernandez M.D.  [address]
  782.0



     [facility] [address]      









 729.5









 Office Visit  2005  9:40a  [facility]  Cristino Cardenas,  [address]
  786.50



       M.DXena    









 278.01







Plan of Care

2018 - Fady Bettencourt MD, FACSK44.9 Diaphragmatic hernia without 
obstruction or gwrxoxiqM51.01 Morbid (severe) obesity due to excess 
caloriesReferral:[facility],J45.909 Unspecified asthma, 
wtyerujotsnvoR06 Essential (primary) hypertension

## 2020-03-09 ENCOUNTER — HOSPITAL ENCOUNTER (OUTPATIENT)
Dept: HOSPITAL 25 - OR | Age: 55
Discharge: HOME | End: 2020-03-09
Attending: SURGERY
Payer: COMMERCIAL

## 2020-03-09 VITALS — SYSTOLIC BLOOD PRESSURE: 131 MMHG | DIASTOLIC BLOOD PRESSURE: 88 MMHG

## 2020-03-09 DIAGNOSIS — Z88.6: ICD-10-CM

## 2020-03-09 DIAGNOSIS — E78.2: ICD-10-CM

## 2020-03-09 DIAGNOSIS — E11.40: ICD-10-CM

## 2020-03-09 DIAGNOSIS — Z88.5: ICD-10-CM

## 2020-03-09 DIAGNOSIS — I10: ICD-10-CM

## 2020-03-09 DIAGNOSIS — J45.909: ICD-10-CM

## 2020-03-09 DIAGNOSIS — R10.32: ICD-10-CM

## 2020-03-09 DIAGNOSIS — Z79.84: ICD-10-CM

## 2020-03-09 DIAGNOSIS — K40.90: Primary | ICD-10-CM

## 2020-03-09 DIAGNOSIS — K21.9: ICD-10-CM

## 2020-03-09 DIAGNOSIS — Z87.891: ICD-10-CM

## 2020-03-09 DIAGNOSIS — Z79.51: ICD-10-CM

## 2020-03-09 PROCEDURE — C1781 MESH (IMPLANTABLE): HCPCS

## 2020-03-09 RX ADMIN — FENTANYL CITRATE PRN MCG: 0.05 INJECTION, SOLUTION INTRAMUSCULAR; INTRAVENOUS at 11:10

## 2020-03-09 RX ADMIN — FENTANYL CITRATE PRN MCG: 0.05 INJECTION, SOLUTION INTRAMUSCULAR; INTRAVENOUS at 10:29

## 2020-03-09 RX ADMIN — FENTANYL CITRATE PRN MCG: 0.05 INJECTION, SOLUTION INTRAMUSCULAR; INTRAVENOUS at 09:28

## 2020-03-09 RX ADMIN — FENTANYL CITRATE PRN MCG: 0.05 INJECTION, SOLUTION INTRAMUSCULAR; INTRAVENOUS at 09:21

## 2020-03-11 NOTE — OP
CC:  Haile Bach MD *

 

DATE OF OPERATION:  03/09/20 - \[facility]\""

 

DATE OF BIRTH:  [birth year]

 

SURGEON:  Fady Bettencourt MD

 

ANESTHESIOLOGIST:  Dr. Garcia.

 

ANESTHESIA:  General endotracheal.

 

PRE-OP DIAGNOSIS:  Right inguinal hernia and left groin pain.

 

POST-OP DIAGNOSIS:  Right inguinal hernia and left groin pain.

 

OPERATIVE PROCEDURE:  Robotic right inguinal hernia repair with mesh.

 

ESTIMATED BLOOD LOSS:  Minimal.

 

IV FLUIDS:  Crystalloids.

 

SPECIMEN:  None.

 

DRAINS:  None.

 

COMPLICATIONS:  None.

 

COUNTS:  Instrument, needle, and sponge counts were correct.

 

DESCRIPTION OF PROCEDURE:  The patient was brought to the operating room and 
placed on the table supine.  Sequential compression devices were placed on both 
lower extremities.  General anesthesia was administered.  He was positioned and 
padded appropriately and he received appropriate intravenous antibiotics.  He 
was prepped and draped in the usual sterile fashion.  A time-out was performed.

 

Local anesthetic was infiltrated to the skin and soft tissue prior to making 
each incision.  Access to the abdominal cavity was achieved after first gaining 
pneumoperitoneum through a transumbilical Veress needle insufflating to a 
pressure of 12 mmHg.  Then, optical 8 mm trocar was placed in the left upper 
quadrant and 2 additional 8 mm trocars placed across the supraumbilical portion 
of the abdomen. Inspection of the laparoscope revealed that there was a right 
inguinal hernia and inspection revealed no evidence of left inguinal hernia.

 

The robot was docked in a standard fashion after introducing the ProGrip right- 
sided anatomic mesh and the V-Loc 90 3-0 suture.

 

Peritoneal flap was raised approximately 8 cm above the inguinal ligament on 
the right side.  Preperitoneal space was developed bluntly.  Pubic symphysis 
and Stevan's ligament were identified.  A direct inguinal hernia was 
identified.  The spermatic cord structures were preserved and the sac was 
completely reduced. Dissection proceeded laterally to the anterior superior 
iliac spine.

 

At this point, the mesh was positioned to cover direct, indirect and femoral 
spaces.  After assuring good positioning of the mesh, the peritoneal flap was 
closed with the 3-0 V-Loc 90 suture running to and fro.  Ports were removed 
under direct visualization and carbon dioxide was released.  The skin incisions 
were closed with 4-0 Monocryl in subcuticular fashion.  Steri-Strips applied.  
The patient tolerated the procedure well, was extubated uneventfully and 
transferred to Recovery stable.

 

 [id number]/[facility] #: [id number]

MTDD